# Patient Record
Sex: MALE | Race: WHITE | NOT HISPANIC OR LATINO | Employment: OTHER | URBAN - METROPOLITAN AREA
[De-identification: names, ages, dates, MRNs, and addresses within clinical notes are randomized per-mention and may not be internally consistent; named-entity substitution may affect disease eponyms.]

---

## 2021-02-03 ENCOUNTER — HOSPITAL ENCOUNTER (INPATIENT)
Facility: MEDICAL CENTER | Age: 86
LOS: 3 days | DRG: 066 | End: 2021-02-09
Attending: INTERNAL MEDICINE | Admitting: INTERNAL MEDICINE
Payer: MEDICARE

## 2021-02-03 DIAGNOSIS — R47.01 APHASIA: ICD-10-CM

## 2021-02-03 DIAGNOSIS — E05.90 HYPERTHYROIDISM: ICD-10-CM

## 2021-02-03 DIAGNOSIS — I63.9 CEREBROVASCULAR ACCIDENT (CVA), UNSPECIFIED MECHANISM (HCC): ICD-10-CM

## 2021-02-03 DIAGNOSIS — I10 ESSENTIAL HYPERTENSION: ICD-10-CM

## 2021-02-03 DIAGNOSIS — I48.91 ATRIAL FIBRILLATION, UNSPECIFIED TYPE (HCC): ICD-10-CM

## 2021-02-03 PROCEDURE — 700111 HCHG RX REV CODE 636 W/ 250 OVERRIDE (IP): Performed by: STUDENT IN AN ORGANIZED HEALTH CARE EDUCATION/TRAINING PROGRAM

## 2021-02-03 PROCEDURE — 700101 HCHG RX REV CODE 250: Performed by: STUDENT IN AN ORGANIZED HEALTH CARE EDUCATION/TRAINING PROGRAM

## 2021-02-03 PROCEDURE — 96372 THER/PROPH/DIAG INJ SC/IM: CPT

## 2021-02-03 PROCEDURE — G0378 HOSPITAL OBSERVATION PER HR: HCPCS

## 2021-02-03 PROCEDURE — 700105 HCHG RX REV CODE 258: Performed by: STUDENT IN AN ORGANIZED HEALTH CARE EDUCATION/TRAINING PROGRAM

## 2021-02-03 PROCEDURE — 99220 PR INITIAL OBSERVATION CARE,LEVL III: CPT | Performed by: STUDENT IN AN ORGANIZED HEALTH CARE EDUCATION/TRAINING PROGRAM

## 2021-02-03 PROCEDURE — 96374 THER/PROPH/DIAG INJ IV PUSH: CPT

## 2021-02-03 RX ORDER — LABETALOL HYDROCHLORIDE 5 MG/ML
10 INJECTION, SOLUTION INTRAVENOUS EVERY 4 HOURS PRN
Status: DISCONTINUED | OUTPATIENT
Start: 2021-02-03 | End: 2021-02-09 | Stop reason: HOSPADM

## 2021-02-03 RX ORDER — POLYETHYLENE GLYCOL 3350 17 G/17G
1 POWDER, FOR SOLUTION ORAL
Status: DISCONTINUED | OUTPATIENT
Start: 2021-02-03 | End: 2021-02-05

## 2021-02-03 RX ORDER — BISACODYL 10 MG
10 SUPPOSITORY, RECTAL RECTAL
Status: DISCONTINUED | OUTPATIENT
Start: 2021-02-03 | End: 2021-02-05

## 2021-02-03 RX ORDER — ACETAMINOPHEN 325 MG/1
650 TABLET ORAL EVERY 6 HOURS PRN
Status: DISCONTINUED | OUTPATIENT
Start: 2021-02-03 | End: 2021-02-05

## 2021-02-03 RX ORDER — SODIUM CHLORIDE 9 MG/ML
INJECTION, SOLUTION INTRAVENOUS CONTINUOUS
Status: DISCONTINUED | OUTPATIENT
Start: 2021-02-03 | End: 2021-02-04

## 2021-02-03 RX ORDER — AMOXICILLIN 250 MG
2 CAPSULE ORAL 2 TIMES DAILY
Status: DISCONTINUED | OUTPATIENT
Start: 2021-02-03 | End: 2021-02-05

## 2021-02-03 RX ADMIN — LABETALOL HYDROCHLORIDE 10 MG: 5 INJECTION, SOLUTION INTRAVENOUS at 22:54

## 2021-02-03 RX ADMIN — ENOXAPARIN SODIUM 60 MG: 60 INJECTION SUBCUTANEOUS at 22:54

## 2021-02-03 RX ADMIN — SODIUM CHLORIDE: 9 INJECTION, SOLUTION INTRAVENOUS at 22:55

## 2021-02-03 SDOH — ECONOMIC STABILITY: TRANSPORTATION INSECURITY
IN THE PAST 12 MONTHS, HAS LACK OF TRANSPORTATION KEPT YOU FROM MEETINGS, WORK, OR FROM GETTING THINGS NEEDED FOR DAILY LIVING?: NO

## 2021-02-03 SDOH — ECONOMIC STABILITY: TRANSPORTATION INSECURITY
IN THE PAST 12 MONTHS, HAS THE LACK OF TRANSPORTATION KEPT YOU FROM MEDICAL APPOINTMENTS OR FROM GETTING MEDICATIONS?: NO

## 2021-02-03 ASSESSMENT — PATIENT HEALTH QUESTIONNAIRE - PHQ9
SUM OF ALL RESPONSES TO PHQ9 QUESTIONS 1 AND 2: 0
2. FEELING DOWN, DEPRESSED, IRRITABLE, OR HOPELESS: NOT AT ALL
1. LITTLE INTEREST OR PLEASURE IN DOING THINGS: NOT AT ALL

## 2021-02-03 ASSESSMENT — LIFESTYLE VARIABLES
HAVE YOU EVER FELT YOU SHOULD CUT DOWN ON YOUR DRINKING: NO
DOES PATIENT WANT TO STOP DRINKING: NO
EVER FELT BAD OR GUILTY ABOUT YOUR DRINKING: NO
TOTAL SCORE: 0
ALCOHOL_USE: NO
HAVE PEOPLE ANNOYED YOU BY CRITICIZING YOUR DRINKING: NO
TOTAL SCORE: 0
HOW MANY TIMES IN THE PAST YEAR HAVE YOU HAD 5 OR MORE DRINKS IN A DAY: 0
CONSUMPTION TOTAL: NEGATIVE
AVERAGE NUMBER OF DAYS PER WEEK YOU HAVE A DRINK CONTAINING ALCOHOL: 0
ON A TYPICAL DAY WHEN YOU DRINK ALCOHOL HOW MANY DRINKS DO YOU HAVE: 0
EVER HAD A DRINK FIRST THING IN THE MORNING TO STEADY YOUR NERVES TO GET RID OF A HANGOVER: NO
TOTAL SCORE: 0

## 2021-02-03 ASSESSMENT — FIBROSIS 4 INDEX: FIB4 SCORE: 1.76

## 2021-02-04 PROBLEM — I48.91 A-FIB (HCC): Status: ACTIVE | Noted: 2021-02-04

## 2021-02-04 PROBLEM — E05.90 HYPERTHYROIDISM: Status: ACTIVE | Noted: 2021-02-04

## 2021-02-04 LAB
ALBUMIN SERPL BCP-MCNC: 3.7 G/DL (ref 3.2–4.9)
ALBUMIN/GLOB SERPL: 1.4 G/DL
ALP SERPL-CCNC: 80 U/L (ref 30–99)
ALT SERPL-CCNC: 15 U/L (ref 2–50)
ANION GAP SERPL CALC-SCNC: 11 MMOL/L (ref 7–16)
AST SERPL-CCNC: 21 U/L (ref 12–45)
BASOPHILS # BLD AUTO: 1.1 % (ref 0–1.8)
BASOPHILS # BLD: 0.08 K/UL (ref 0–0.12)
BILIRUB SERPL-MCNC: 1 MG/DL (ref 0.1–1.5)
BUN SERPL-MCNC: 16 MG/DL (ref 8–22)
CALCIUM SERPL-MCNC: 9.1 MG/DL (ref 8.5–10.5)
CHLORIDE SERPL-SCNC: 105 MMOL/L (ref 96–112)
CHOLEST SERPL-MCNC: 182 MG/DL (ref 100–199)
CO2 SERPL-SCNC: 22 MMOL/L (ref 20–33)
CREAT SERPL-MCNC: 1.12 MG/DL (ref 0.5–1.4)
EOSINOPHIL # BLD AUTO: 0.11 K/UL (ref 0–0.51)
EOSINOPHIL NFR BLD: 1.6 % (ref 0–6.9)
ERYTHROCYTE [DISTWIDTH] IN BLOOD BY AUTOMATED COUNT: 45.3 FL (ref 35.9–50)
EST. AVERAGE GLUCOSE BLD GHB EST-MCNC: 114 MG/DL
GLOBULIN SER CALC-MCNC: 2.6 G/DL (ref 1.9–3.5)
GLUCOSE SERPL-MCNC: 99 MG/DL (ref 65–99)
HBA1C MFR BLD: 5.6 % (ref 0–5.6)
HCT VFR BLD AUTO: 43.1 % (ref 42–52)
HDLC SERPL-MCNC: 48 MG/DL
HGB BLD-MCNC: 14.3 G/DL (ref 14–18)
IMM GRANULOCYTES # BLD AUTO: 0.03 K/UL (ref 0–0.11)
IMM GRANULOCYTES NFR BLD AUTO: 0.4 % (ref 0–0.9)
LDLC SERPL CALC-MCNC: 117 MG/DL
LYMPHOCYTES # BLD AUTO: 1.56 K/UL (ref 1–4.8)
LYMPHOCYTES NFR BLD: 22.4 % (ref 22–41)
MCH RBC QN AUTO: 28 PG (ref 27–33)
MCHC RBC AUTO-ENTMCNC: 33.2 G/DL (ref 33.7–35.3)
MCV RBC AUTO: 84.3 FL (ref 81.4–97.8)
MONOCYTES # BLD AUTO: 0.54 K/UL (ref 0–0.85)
MONOCYTES NFR BLD AUTO: 7.8 % (ref 0–13.4)
NEUTROPHILS # BLD AUTO: 4.64 K/UL (ref 1.82–7.42)
NEUTROPHILS NFR BLD: 66.7 % (ref 44–72)
NRBC # BLD AUTO: 0 K/UL
NRBC BLD-RTO: 0 /100 WBC
PLATELET # BLD AUTO: 220 K/UL (ref 164–446)
PMV BLD AUTO: 12.6 FL (ref 9–12.9)
POTASSIUM SERPL-SCNC: 3.7 MMOL/L (ref 3.6–5.5)
PROT SERPL-MCNC: 6.3 G/DL (ref 6–8.2)
RBC # BLD AUTO: 5.11 M/UL (ref 4.7–6.1)
SODIUM SERPL-SCNC: 138 MMOL/L (ref 135–145)
TRIGL SERPL-MCNC: 85 MG/DL (ref 0–149)
WBC # BLD AUTO: 7 K/UL (ref 4.8–10.8)

## 2021-02-04 PROCEDURE — 700105 HCHG RX REV CODE 258: Performed by: STUDENT IN AN ORGANIZED HEALTH CARE EDUCATION/TRAINING PROGRAM

## 2021-02-04 PROCEDURE — 80053 COMPREHEN METABOLIC PANEL: CPT

## 2021-02-04 PROCEDURE — 97165 OT EVAL LOW COMPLEX 30 MIN: CPT

## 2021-02-04 PROCEDURE — 92612 ENDOSCOPY SWALLOW (FEES) VID: CPT

## 2021-02-04 PROCEDURE — 83036 HEMOGLOBIN GLYCOSYLATED A1C: CPT

## 2021-02-04 PROCEDURE — 96376 TX/PRO/DX INJ SAME DRUG ADON: CPT

## 2021-02-04 PROCEDURE — 92610 EVALUATE SWALLOWING FUNCTION: CPT

## 2021-02-04 PROCEDURE — 97161 PT EVAL LOW COMPLEX 20 MIN: CPT

## 2021-02-04 PROCEDURE — 99225 PR SUBSEQUENT OBSERVATION CARE,LEVEL II: CPT | Performed by: INTERNAL MEDICINE

## 2021-02-04 PROCEDURE — 96372 THER/PROPH/DIAG INJ SC/IM: CPT

## 2021-02-04 PROCEDURE — 36415 COLL VENOUS BLD VENIPUNCTURE: CPT

## 2021-02-04 PROCEDURE — 700111 HCHG RX REV CODE 636 W/ 250 OVERRIDE (IP): Performed by: STUDENT IN AN ORGANIZED HEALTH CARE EDUCATION/TRAINING PROGRAM

## 2021-02-04 PROCEDURE — 85025 COMPLETE CBC W/AUTO DIFF WBC: CPT

## 2021-02-04 PROCEDURE — G0378 HOSPITAL OBSERVATION PER HR: HCPCS

## 2021-02-04 PROCEDURE — 80061 LIPID PANEL: CPT

## 2021-02-04 RX ORDER — LOSARTAN POTASSIUM 25 MG/1
25 TABLET ORAL
Status: DISCONTINUED | OUTPATIENT
Start: 2021-02-04 | End: 2021-02-04

## 2021-02-04 RX ORDER — DEXTROSE AND SODIUM CHLORIDE 5; .9 G/100ML; G/100ML
INJECTION, SOLUTION INTRAVENOUS CONTINUOUS
Status: DISCONTINUED | OUTPATIENT
Start: 2021-02-04 | End: 2021-02-05

## 2021-02-04 RX ORDER — LOSARTAN POTASSIUM 25 MG/1
25 TABLET ORAL
Status: DISCONTINUED | OUTPATIENT
Start: 2021-02-04 | End: 2021-02-05

## 2021-02-04 RX ORDER — ATORVASTATIN CALCIUM 40 MG/1
40 TABLET, FILM COATED ORAL EVERY EVENING
Status: DISCONTINUED | OUTPATIENT
Start: 2021-02-04 | End: 2021-02-05

## 2021-02-04 RX ADMIN — LABETALOL HYDROCHLORIDE 10 MG: 5 INJECTION, SOLUTION INTRAVENOUS at 17:19

## 2021-02-04 RX ADMIN — DEXTROSE AND SODIUM CHLORIDE: 5; 900 INJECTION, SOLUTION INTRAVENOUS at 23:26

## 2021-02-04 RX ADMIN — SODIUM CHLORIDE: 9 INJECTION, SOLUTION INTRAVENOUS at 11:12

## 2021-02-04 RX ADMIN — LABETALOL HYDROCHLORIDE 10 MG: 5 INJECTION, SOLUTION INTRAVENOUS at 12:35

## 2021-02-04 RX ADMIN — LABETALOL HYDROCHLORIDE 10 MG: 5 INJECTION, SOLUTION INTRAVENOUS at 05:17

## 2021-02-04 RX ADMIN — LABETALOL HYDROCHLORIDE 10 MG: 5 INJECTION, SOLUTION INTRAVENOUS at 04:10

## 2021-02-04 RX ADMIN — ENOXAPARIN SODIUM 60 MG: 60 INJECTION SUBCUTANEOUS at 23:26

## 2021-02-04 RX ADMIN — ENOXAPARIN SODIUM 60 MG: 60 INJECTION SUBCUTANEOUS at 11:00

## 2021-02-04 ASSESSMENT — PATIENT HEALTH QUESTIONNAIRE - PHQ9
SUM OF ALL RESPONSES TO PHQ9 QUESTIONS 1 AND 2: 0
SUM OF ALL RESPONSES TO PHQ9 QUESTIONS 1 AND 2: 0
2. FEELING DOWN, DEPRESSED, IRRITABLE, OR HOPELESS: NOT AT ALL
2. FEELING DOWN, DEPRESSED, IRRITABLE, OR HOPELESS: NOT AT ALL
1. LITTLE INTEREST OR PLEASURE IN DOING THINGS: NOT AT ALL
1. LITTLE INTEREST OR PLEASURE IN DOING THINGS: NOT AT ALL

## 2021-02-04 ASSESSMENT — ENCOUNTER SYMPTOMS
CONSTIPATION: 0
WEIGHT LOSS: 0
FOCAL WEAKNESS: 1
BLURRED VISION: 0
WHEEZING: 0
SPUTUM PRODUCTION: 0
ABDOMINAL PAIN: 0
ORTHOPNEA: 0
SHORTNESS OF BREATH: 0
NECK PAIN: 0
CHILLS: 0
WEAKNESS: 0
SORE THROAT: 0
DIARRHEA: 0
FEVER: 0
SPEECH CHANGE: 1
HEADACHES: 0
HEMOPTYSIS: 0
PALPITATIONS: 0
BLOOD IN STOOL: 0
VOMITING: 0
COUGH: 0
DIAPHORESIS: 0
BACK PAIN: 0
BRUISES/BLEEDS EASILY: 0
MYALGIAS: 0
SEIZURES: 0
LOSS OF CONSCIOUSNESS: 0
EYE PAIN: 0
FLANK PAIN: 0
NAUSEA: 0
DIZZINESS: 0

## 2021-02-04 ASSESSMENT — COGNITIVE AND FUNCTIONAL STATUS - GENERAL
DAILY ACTIVITIY SCORE: 24
SUGGESTED CMS G CODE MODIFIER DAILY ACTIVITY: CH
MOBILITY SCORE: 24
SUGGESTED CMS G CODE MODIFIER DAILY ACTIVITY: CH
SUGGESTED CMS G CODE MODIFIER MOBILITY: CH
DAILY ACTIVITIY SCORE: 24
MOBILITY SCORE: 24
SUGGESTED CMS G CODE MODIFIER MOBILITY: CH

## 2021-02-04 ASSESSMENT — GAIT ASSESSMENTS
GAIT LEVEL OF ASSIST: SUPERVISED
DISTANCE (FEET): 200

## 2021-02-04 ASSESSMENT — FIBROSIS 4 INDEX: FIB4 SCORE: 2.12

## 2021-02-04 ASSESSMENT — ACTIVITIES OF DAILY LIVING (ADL): TOILETING: INDEPENDENT

## 2021-02-04 NOTE — ASSESSMENT & PLAN NOTE
Stroke secondary to atrial fibrillation due to non compliance with anticoagulation medication  Aphasia, dysarthria, lower right sided facial droop and dysphagia. NIHSS 4  No other focal neuro deficits appreciated  Reports some improvement since initial onset  Has history of CVA in 2001  Hx of Afib, stopped his Eliquis in December 2020 on his own  MRI brain was negative for acute stroke but revealed old left occipital lobe and right lacunar infarcts  Discussed with Neurology Dr Acuna  Restart Eliquis   Started on Lipitor 80 mg daily  BP control with Losartan 50  Continue speech therapy

## 2021-02-04 NOTE — PROGRESS NOTES
2 RN skin check completed with ANTHONY Estrada.     Devices in place: cardiac monitor and PIV     TYRONE ears, heels, elbows, and sacrum intact, pink, and blanching. Small red possible mole noted behind R ear.

## 2021-02-04 NOTE — ASSESSMENT & PLAN NOTE
Diagnosed w/ afib January 2020  Was started on eliquis at that time but discontinued it  currently rate controlled. Patient is refusing metoprolol states it causes severe sweating  Restarted on Eliquis  Started on cardizem  Counseled on importance of taking it consistently to prevent future strokes

## 2021-02-04 NOTE — PROGRESS NOTES
Pt's BP at 0359: 172/100. Pt's BP at 0500: 164/114. Medicated pt as per MAR. Rechecked BP at 0629: 169/104. No changes in neurological status. On-call provider notified.

## 2021-02-04 NOTE — PROGRESS NOTES
Dr. Cristobal to bedside. MD aware of pt's current BP and failed swallow screen. Orders received.

## 2021-02-04 NOTE — THERAPY
"Occupational Therapy   Initial Evaluation     Patient Name: Kerwin Wheat  Age:  86 y.o., Sex:  male  Medical Record #: [id number]  Today's Date: 2/4/2021          Assessment  Patient is 86 y.o. male admitted for slurred speech, possible expressive aphasia, MRI and CT (-). Pt lives in a SLH with son, independent with all mobility and ADLs at baseline. Pt presents at his functional baseline, did not require any physical assistance and able to complete all tasks safely. No needs identified, will complete order.    Plan    Recommend Occupational Therapy for Evaluation only.    DC Equipment Recommendations: (P) None  Discharge Recommendations: (P) Anticipate that the patient will have no further occupational therapy needs after discharge from the hospital     Subjective    \"When can I drink water?\"     Objective       02/04/21 0830   Prior Living Situation   Prior Services None   Housing / Facility 1 Story House   Steps Into Home 0   Steps In Home 0   Bathroom Set up Walk In Shower   Equipment Owned None   Lives with - Patient's Self Care Capacity Adult Children   Prior Level of ADL Function   Self Feeding Independent   Grooming / Hygiene Independent   Bathing Independent   Dressing Independent   Toileting Independent   Prior Level of IADL Function   Medication Management Independent   Laundry Independent   Kitchen Mobility Independent   Finances Independent   Home Management Independent   Shopping Independent   Prior Level Of Mobility Independent Without Device in Community   Driving / Transportation Unable To Determine At This Time   Occupation (Pre-Hospital Vocational) Retired Due To Age   History of Falls   History of Falls No   Pain 0 - 10 Group   Therapist Pain Assessment 0;Post Activity Pain Same as Prior to Activity   Non Verbal Descriptors   Non Verbal Scale  Calm   Cognition    Cognition / Consciousness WDL   Level of Consciousness Alert   Comments pleasant, cooperative   Active ROM Upper Body   Active " ROM Upper Body  WDL   Dominant Hand Right   Strength Upper Body   Upper Body Strength  WDL   Sensation Upper Body   Upper Extremity Sensation  WDL   Upper Body Muscle Tone   Upper Body Muscle Tone  WDL   Neurological Concerns   Neurological Concerns No   Coordination Upper Body   Coordination WDL   Balance Assessment   Sitting Balance (Static) Fair +   Sitting Balance (Dynamic) Fair +   Standing Balance (Static) Fair +   Standing Balance (Dynamic) Fair +   Weight Shift Sitting Good   Weight Shift Standing Good   Comments no AD needed   Bed Mobility    Supine to Sit Supervised   Sit to Supine Supervised   Scooting Supervised   ADL Assessment   Grooming Supervision;Standing   Upper Body Dressing Supervision   Lower Body Dressing Supervision   Toileting Supervision   How much help from another person does the patient currently need...   Putting on and taking off regular lower body clothing? 4   Bathing (including washing, rinsing, and drying)? 4   Toileting, which includes using a toilet, bedpan, or urinal? 4   Putting on and taking off regular upper body clothing? 4   Taking care of personal grooming such as brushing teeth? 4   Eating meals? 4   6 Clicks Daily Activity Score 24   Functional Mobility   Sit to Stand Supervised   Bed, Chair, Wheelchair Transfer Supervised   Toilet Transfers Supervised   Transfer Method Stand Step   Mobility bed mobility, mobility in hallway, bathroom mobility, BTB   Comments no AD   Visual Perception   Visual Perception  WDL   Activity Tolerance   Sitting in Chair 5   Standing 5   Education Group   Education Provided Role of Occupational Therapist   Role of Occupational Therapist Patient Response Patient;Acceptance;Explanation   Interdisciplinary Plan of Care Collaboration   IDT Collaboration with  Nursing   Patient Position at End of Therapy In Bed;Call Light within Reach;Tray Table within Reach;Phone within Reach   Collaboration Comments RN updated

## 2021-02-04 NOTE — PROGRESS NOTES
[facility] TRIAGE OFFICER DIRECT ADMISSION REPORT  Transferring facility: [facility]   Transferring physician: Dr Hannah  Chief complaint: aphasia   Pertinent history & patient course:      86 year old male with hx of afib and CVA came with aphasia, MRI and CTA did not show any acute finding or acute stroke, his symptom did not improve, Dr Acuna neurologist was consulted and recommended to transfer the patient to [facility] for neurology consult and workup.  The patient has not taken eliquis due to side effects?? for 2 months      Further work up or recommendations per triage officer prior to transfer: none  Consultants called prior to transfer and pertinent input from consultants: neurology Dr England  Patient accepted for transfer: Yes  Consultants to be called upon arrival: Neurology   Admission status: Observation.   Floor requested: tele  If ICU transfer, name of intensivist case discussed with and pertinent input from critical care: n/a    Please inform the triage officer upon arrival of the patient to [facility] for assignment of a hospitalist to perform admission.     For any question or concerns regarding the care of this patient, please reach out to the assigned hospitalist.

## 2021-02-04 NOTE — PROGRESS NOTES
Hospital Medicine Daily Progress Note    Date of Service  2/4/2021    Chief Complaint  86 y.o. male with a past medical history of hypertension, atrial fibrillation not on anticoagulation, stroke admitted 2/3/2021 with sudden onset of aphasia that started on 2/3/2021 at 3 PM    Hospital Course      Interval Problem Update  Patient reports some improvement of his aphasia continues to have significant dysarthria.  He also has facial asymmetry. He was evaluated by SLP and was noted to have some signs of aspiration therefore he was recommended to be kept n.p.o. with a FEES study.  His blood pressure remains elevated, he is past the window for permissive hypertension.  I have started him on losartan.  IV labetalol as needed for SBP greater than 160.  He will be continued on Lovenox at this time until he is able to tolerate p.o. intake at which time we will start him on Eliquis.    Consultants/Specialty  None    Code Status  Full Code    Disposition  Likely home tomorrow    Review of Systems  Review of Systems   Constitutional: Negative for chills, diaphoresis and fever.   HENT: Negative for hearing loss and sore throat.    Eyes: Negative for blurred vision.   Respiratory: Negative for cough, sputum production, shortness of breath and wheezing.    Cardiovascular: Negative for chest pain, palpitations and leg swelling.   Gastrointestinal: Negative for abdominal pain, blood in stool, diarrhea, nausea and vomiting.   Genitourinary: Negative for dysuria, flank pain and urgency.   Musculoskeletal: Negative for back pain, joint pain, myalgias and neck pain.   Skin: Negative for rash.   Neurological: Positive for speech change and focal weakness. Negative for dizziness, seizures and headaches.   Endo/Heme/Allergies: Does not bruise/bleed easily.   Psychiatric/Behavioral: Negative for suicidal ideas.   All other systems reviewed and are negative.       Physical Exam  Temp:  [36.3 °C (97.3 °F)-36.4 °C (97.6 °F)] 36.4 °C (97.6  °F)  Pulse:  [68-98] 78  Resp:  [14-17] 16  BP: (159-172)/() 168/102  SpO2:  [96 %-97 %] 97 %    Physical Exam  Vitals signs and nursing note reviewed.   Constitutional:       General: He is not in acute distress.     Appearance: Normal appearance.   HENT:      Head: Normocephalic and atraumatic.      Nose: Nose normal.      Mouth/Throat:      Mouth: Mucous membranes are moist.   Eyes:      Extraocular Movements: Extraocular movements intact.      Conjunctiva/sclera: Conjunctivae normal.      Pupils: Pupils are equal, round, and reactive to light.   Neck:      Musculoskeletal: Normal range of motion and neck supple.   Cardiovascular:      Rate and Rhythm: Normal rate and regular rhythm.      Pulses: Normal pulses.      Heart sounds: Normal heart sounds.   Pulmonary:      Effort: Pulmonary effort is normal. No respiratory distress.      Breath sounds: Normal breath sounds. No wheezing, rhonchi or rales.   Abdominal:      General: Bowel sounds are normal. There is no distension.      Palpations: Abdomen is soft.      Tenderness: There is no abdominal tenderness.   Musculoskeletal: Normal range of motion.         General: No swelling or tenderness.   Lymphadenopathy:      Cervical: No cervical adenopathy.   Skin:     General: Skin is warm.      Coloration: Skin is not jaundiced.      Findings: No rash.   Neurological:      General: No focal deficit present.      Mental Status: He is alert and oriented to person, place, and time.      Cranial Nerves: No cranial nerve deficit.      Motor: No weakness.      Comments: Right facial droop  Mild aphasia and moderate dysarthria  Strength and sensation intact of bilateral upper and lower extremities   Psychiatric:         Mood and Affect: Mood normal.         Behavior: Behavior normal.         Fluids    Intake/Output Summary (Last 24 hours) at 2/4/2021 1408  Last data filed at 2/3/2021 2200  Gross per 24 hour   Intake --   Output 450 ml   Net -450 ml        Laboratory  Recent Labs     02/03/21  1505 02/04/21  0646   WBC 9.1 7.0   RBC 5.42 5.11   HEMOGLOBIN 15.2 14.3   HEMATOCRIT 45.4 43.1   MCV 83.8 84.3   MCH 28.0 28.0   MCHC 33.5 33.2*   RDW 14.7* 45.3   PLATELETCT 254 220   MPV 10.8* 12.6     Recent Labs     02/03/21  1505 02/04/21  0646   SODIUM 142 138   POTASSIUM 3.3* 3.7   CHLORIDE 102 105   CO2 27 22   GLUCOSE 88 99   BUN 23* 16   CREATININE 1.5* 1.12   CALCIUM 9.0 9.1     Recent Labs     02/03/21  1505   INR 1.02         Recent Labs     02/04/21  0646   TRIGLYCERIDE 85   HDL 48   *       Imaging  EC-ECHOCARDIOGRAM COMPLETE W/O CONT    (Results Pending)        Assessment/Plan  * Aphasia- (present on admission)  Assessment & Plan  Aphasia and lower right sided facial droop  No other focal neuro deficits appreciated  Reports some improvement since initial onset earlier today  Has history of CVA in 2001  Hx of Afib, stopped his Eliquis in December 2020 on his own  MRI brain was negative for acute stroke but revealed old left occipital lobe and right lacunar infarcts  Restart full dose AC w/ Lovenox 1mg/kg BID. Restart Eliquis once he is able to swallow  Neurology (Dr. Acuna) was consulted    Hyperthyroidism  Assessment & Plan  TSH 4.31, FT4 1.06  - will need repeat TSH testing outpatient    A-fib (HCC)- (present on admission)  Assessment & Plan  Diagnosed w/ afib January 2020  Was started on eliquis at that time but discontinued it  currently rate controlled  Lovenox 1mg/kg BID for now    VTE: SCD, Lovenox full dose       VTE prophylaxis: Lovenox

## 2021-02-04 NOTE — H&P
Hospital Medicine History & Physical Note    Date of Service  2/3/2021    Primary Care Physician  Pcp Not In Computer    Consultants  Neurology    Code Status  Full Code    Chief Complaint  Slurring of speech    History of Presenting Illness  86 y.o. male who presented 2/3/2021 transfer from [facility] for slurred speech.  She has history of A. 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  This afternoon patient was in normal state of health, when around 2:50 PM he developed acute onset inability to talk.  Son reported that patient went to go to the bathroom and upon his return he was unable to speak.  He was reported to be frustrated that he was unable to get his words out.  At that time, there were no reports of seizure-like activity, confusion, motor weakness, or obvious facial droop.  Son was concerned for possible stroke at which time he was brought to [facility] ED.  While there, CTA head and neck and MRI brain were performed without significant findings.  Chest x-ray was also performed without significant findings.  Ammonia level was less than 10, troponin I less than 0.02, UA negative, B12 level 10,453, TSH 4.31, free T4 1.06, Covid negative.  Etiology of aphasia was unclear.  Patient was given 1 L NS, magnesium, and Tylenol.  Neurology was consulted and recommended patient be transferred to [facility] for further work-up.  Patient currently lying in bed in no acute distress.  Denies any acute complaints at this time.  Does report some improvement in his aphasia.    Review of Systems  Review of Systems   Constitutional: Negative for chills, fever and weight loss.   HENT: Negative for hearing loss and sore throat.    Eyes: Negative for blurred vision and pain.   Respiratory: Negative for cough, hemoptysis, shortness of breath and wheezing.    Cardiovascular: Negative for chest pain, palpitations,  orthopnea and leg swelling.   Gastrointestinal: Negative for abdominal pain, blood in stool, constipation, diarrhea, nausea and vomiting.   Genitourinary: Negative for dysuria and hematuria.   Musculoskeletal: Negative for joint pain and myalgias.   Skin: Negative for rash.   Neurological: Positive for speech change (aphasia). Negative for dizziness, loss of consciousness and weakness.       Past Medical History   has a past medical history of 2002.    Surgical History  Significant past surgical history    Family History  No significant family history    Social History   reports previous alcohol use. He reports that he does not use drugs.    Allergies  No Known Allergies    Medications  Prior to Admission Medications   Prescriptions Last Dose Informant Patient Reported? Taking?   Apixaban (ELIQUIS PO)   Yes No   Sig: Take  by mouth.   Ascorbic Acid (ADOLFO-C PO) 2/2/2021 at Unknown time  Yes No   Sig: Take  by mouth.   Magnesium Gluconate (MAGNESIUM 27 PO) 2/2/2021 at Unknown time  Yes No   Sig: Take  by mouth.   metoprolol SR (TOPROL XL) 25 MG TABLET SR 24 HR 2/2/2021 at Unknown time  Yes No   Sig: Take 25 mg by mouth every day.      Facility-Administered Medications: None       Physical Exam  Temp:  [36.3 °C (97.3 °F)] 36.3 °C (97.3 °F)  Pulse:  [72] 72  Resp:  [14] 14  BP: (169)/(93) 169/93  SpO2:  [97 %] 97 %    Physical Exam  Vitals signs and nursing note reviewed.   Constitutional:       General: He is not in acute distress.     Appearance: Normal appearance.   HENT:      Mouth/Throat:      Mouth: Mucous membranes are moist.   Eyes:      Extraocular Movements: Extraocular movements intact.   Cardiovascular:      Rate and Rhythm: Normal rate and regular rhythm.      Heart sounds: No murmur. No gallop.    Pulmonary:      Effort: Pulmonary effort is normal.      Breath sounds: Normal breath sounds. No wheezing, rhonchi or rales.   Abdominal:      General: Abdomen is flat. Bowel sounds are normal. There is no  distension.      Palpations: Abdomen is soft.      Tenderness: There is no abdominal tenderness.   Musculoskeletal: Normal range of motion.         General: No deformity.      Right lower leg: No edema.      Left lower leg: No edema.   Neurological:      General: No focal deficit present.      Mental Status: He is alert and oriented to person, place, and time.      Sensory: No sensory deficit.      Motor: No weakness.      Comments: Aphasia. Right sided facial droop         Laboratory:  Recent Labs     02/03/21  1505   WBC 9.1   RBC 5.42   HEMOGLOBIN 15.2   HEMATOCRIT 45.4   MCV 83.8   MCH 28.0   MCHC 33.5   RDW 14.7*   PLATELETCT 254   MPV 10.8*     Recent Labs     02/03/21  1505   SODIUM 142   POTASSIUM 3.3*   CHLORIDE 102   CO2 27   GLUCOSE 88   BUN 23*   CREATININE 1.5*   CALCIUM 9.0     Recent Labs     02/03/21  1505   ALTSGPT 25   ASTSGOT 26   ALKPHOSPHAT 106   TBILIRUBIN 0.8   GLUCOSE 88     Recent Labs     02/03/21  1505   INR 1.02     No results for input(s): NTPROBNP in the last 72 hours.      No results for input(s): TROPONINT in the last 72 hours.    Imaging:  No orders to display         Assessment/Plan:  I anticipate this patient is appropriate for observation status at this time.    * Aphasia- (present on admission)  Assessment & Plan  Aphasia and lower right sided facial droop  No other focal neuro deficits appreciated  Reports some improvement since initial onset earlier today  Has history of CVA in 2001  Hx of Afib, stopped his Eliquis in December 2020 on his own  CTA head, MRI brain, and CXR unrevealing  Unclear cause of symptoms at this time  - Npo  - Speech swallow eval  - Pt/ot  - F/u A1c, lipid  - Restart full dose AC w/ Lovenox 1mg/kg BID  - Neurology (Dr. Acuna) was consulted    Hyperthyroidism  Assessment & Plan  TSH 4.31, FT4 1.06  - will need repeat TSH testing outpatient    A-fib (HCC)- (present on admission)  Assessment & Plan  Diagnosed w/ afib January 2020  Was started on eliquis at  that time  Stopped eliquis in Dec due to side effects? (sweating, headaches, dizziness)  - currently rate controlled at this time, restart home Metop XL 50mg BID  - Lovenox 1mg/kg BID for now    VTE: SCD, Lovenox full dose

## 2021-02-04 NOTE — PROGRESS NOTES
Pt arrived to 710 via [facility]vey with two EMS providers. Patient is A&Ox4, exhibiting expressive aphasia, and awake in bed. Patient is in no signs of distress, unlabored breathing on RA and denies pain at this time. Patient was updated on the plan of care for the day and oriented to unit. Call light within reach, bed in low position, 3 side rails up. Fall precautions in place, tele box is on, and confirmed cardiac rhythm is being monitored. Will round hourly.

## 2021-02-04 NOTE — THERAPY
"Speech Language Pathology   Clinical Swallow Evaluation     Patient Name: Kerwin Wheat  AGE:  86 y.o., SEX:  male  Medical Record #: [id number]  Today's Date: 2/4/2021     Precautions: Fall Risk, Swallow Precautions ( See Comments)  Comments: Aspiration risk     Assessment    Patient is 86 y.o. male admitted 2/3/21 for possible CVA. Son was concerned for possible stroke at which time he was brought to [facility] ED.  While there, CTA head and neck and MRI brain were performed without significant findings. PMHx: A. 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 Chest x-ray was also performed without significant findings. Additional factors influencing patient status/progress: ?baseline dysphagia, dysarthria, expressive aphasia.    Patient was seen on this date for a clinical swallow evaluation. Son at bedside for session. Patient awake, AAOx4, and eager for PO. Speech with mild expressive aphasia and mild-mod dysarthria (formal speech-language evaluation indicated). Oral motor examination revealed minimal facial asymmetry at rest but appeared symmetrical during labial retraction task. Otherwise, was unremarkable. PO trials were administered and consisted of ice chips, MTL, applesauce, pudding, and thin liquids. Patient presented with inconsistent s/sx of aspiration as seen by inconsistent throat clearing response (50-75% of all trials), wet vocal quality x1 following MTL and thin liquids, and coughing response x1 following MTL, thins, and pudding. Belching response x3 occurred and pt reported due to having \"an empty stomach.\" After one of coughing episodes, son stated, \"that's not uncommon.\" Education provided to pt and son regarding current status and SLP recs.    Plan    Given inconsistent s/sx of aspiration, unknown history of dysphagia, and current dysarthria would recommend continue NPO with a FEES to be " "completed this afternoon to further assess oropharyngeal function and rule out aspiration. OK for pt to have single ice chips in the interim. Please consider placing orders for a cognitive-linguistic evaluation given acute changes to speech and language.     Recommend Speech Therapy 3 times per week until therapy goals are met for the following treatments:  Dysphagia Training and Patient / Family / Caregiver Education.    Discharge Recommendations: Recommend post-acute placement for additional speech therapy services prior to discharge home    Objective       02/04/21 1046   Vitals   O2 Delivery Device None - Room Air   Prior Level Of Function   Communication Within Functional Limits   Swallow Impaired  (Per son, throat clearing and cough is \"not uncommon\")   Dentition Intact   Dentures None   Hearing Within Functional Limits for Evaluation   Hearing Aid None   Vision Within Functional Limits for Evaluation   Patient's Primary Language English   Oral Motor Eval    Is Patient Able to Complete Oral Motor Eval Yes but Impaired   Labial Function   Labial Structure At Rest Minimal  (?natural asymmetry of face)   Labial Vowel Production / I /, / U / Within Functional Limits   Labial Sequence / I /, / U / Within Functional Limits   Lingual Function   Lingual Structure At Rest Within Functional Limits   Lingual Protrude Within Functional Limits   Lingual Retract Within Functional Limits   Elevate In Mouth Within Functional Limits   Elevate Outside Mouth Within Functional Limits   Lateralization No Impairment Right;No Impairment Left   / Pa / 5X's Minimal   / Ta / 5X's Minimal   / Ka / 5X's Minimal   / [place] / 5X's Moderate   Jaw   Jaw Structure At Rest Within Functional Limits   Bite ([place]) Not Tested   Chew ([place]) Not Tested   Jaw Open / Resist Within Functional Limits   Jaw Close / Resist Within Functional Limits   Velar Function   Velar Structure At Rest Within Functional Limits   / A / Prolonged Within Functional " Limits   Laryngeal Function   Voice Quality Within Functional Limits   Volutional Cough Within Functional Limits   Excursion Upon Swallow Complete   Oral Food Presentation   Ice Chips Within Functional Limits   Single Swallow Mildly Thick (2) - (Nectar Thick)  Moderate   Single Swallow Thin (0) Moderate   Liquidised (3) Moderate   Pureed (4) Minimal   Self Feeding Independent   Dysphagia Strategies / Recommendations   Compensatory Strategies To Be Assessed   Diet / Liquid Recommendation NPO;Pre-Feeding Trials with SLP Only   Medication Administration    (TBD)   Therapy Interventions Dysphagia Therapy By Speech Language Pathologist;FEES Evaluation   Short Term Goals   Short Term Goal # 1 Patient will consume prefeeding trials with SLP with no overt s/sx of aspiration.

## 2021-02-04 NOTE — THERAPY
Physical Therapy   Initial Evaluation     Patient Name: Kerwin Wheat  Age:  86 y.o., Sex:  male  Medical Record #: [id number]  Today's Date: 2/4/2021          Assessment  Patient is 86 y.o. male transferred from [facility] due to slurred speech.  MRI and CTA negative.  Hx of CVA in 2002.  He lives in [place] w/ his son where he was indep w/ mobility.  Today, he presents essentially at his PLOF.  He is able to move in/out of bed and ambulate w/o  ft w/o loss of balance.  No acute PT needs at this time.  PT will be available for d/c needs.    Plan    Recommend Physical Therapy for Evaluation only    DC Equipment Recommendations: None  Discharge Recommendations: Anticipate that the patient will have no further physical therapy needs after discharge from the hospital         Objective       02/04/21 0825   Prior Living Situation   Housing / Facility [address] [facility]   Steps Into Home 0   Steps In Home 0   Equipment Owned None   Lives with - Patient's Self Care Capacity Adult Children   Prior Level of Functional Mobility   Bed Mobility Independent   Transfer Status Independent   Ambulation Independent   Assistive Devices Used None   Stairs Independent   Strength Lower Body   Comments no gross deviation affecting fxl mobility   Strength Upper Body   Comments no gross deviations affecting fxl mobility   Balance Assessment   Sitting Balance (Static) Fair +   Sitting Balance (Dynamic) Fair +   Standing Balance (Static) Fair +   Standing Balance (Dynamic) Fair +   Weight Shift Sitting Good   Weight Shift Standing Good   Gait Analysis   Gait Level Of Assist Supervised   Assistive Device None   Distance (Feet) 200   Bed Mobility    Supine to Sit Supervised   Sit to Supine Supervised   Scooting Supervised   Functional Mobility   Sit to Stand Supervised   Bed, Chair, Wheelchair Transfer Supervised   Anticipated Discharge Equipment and Recommendations   DC Equipment Recommendations None   Discharge  Recommendations Anticipate that the patient will have no further physical therapy needs after discharge from the hospital

## 2021-02-04 NOTE — PROGRESS NOTES
Monitor summary: A fib: 74-86  -/.12/-  (R) PVC  Per strip from monitor room    12 hour chart check!

## 2021-02-05 ENCOUNTER — APPOINTMENT (OUTPATIENT)
Dept: RADIOLOGY | Facility: MEDICAL CENTER | Age: 86
DRG: 066 | End: 2021-02-05
Attending: INTERNAL MEDICINE
Payer: MEDICARE

## 2021-02-05 ENCOUNTER — APPOINTMENT (OUTPATIENT)
Dept: CARDIOLOGY | Facility: MEDICAL CENTER | Age: 86
DRG: 066 | End: 2021-02-05
Attending: INTERNAL MEDICINE
Payer: MEDICARE

## 2021-02-05 PROBLEM — I63.9 STROKE (HCC): Status: ACTIVE | Noted: 2021-02-03

## 2021-02-05 PROBLEM — I10 ESSENTIAL HYPERTENSION: Status: ACTIVE | Noted: 2021-02-05

## 2021-02-05 LAB
ANION GAP SERPL CALC-SCNC: 9 MMOL/L (ref 7–16)
BASOPHILS # BLD AUTO: 1.2 % (ref 0–1.8)
BASOPHILS # BLD: 0.07 K/UL (ref 0–0.12)
BUN SERPL-MCNC: 15 MG/DL (ref 8–22)
CALCIUM SERPL-MCNC: 8.8 MG/DL (ref 8.5–10.5)
CHLORIDE SERPL-SCNC: 107 MMOL/L (ref 96–112)
CO2 SERPL-SCNC: 24 MMOL/L (ref 20–33)
CREAT SERPL-MCNC: 1.32 MG/DL (ref 0.5–1.4)
EOSINOPHIL # BLD AUTO: 0.19 K/UL (ref 0–0.51)
EOSINOPHIL NFR BLD: 3.2 % (ref 0–6.9)
ERYTHROCYTE [DISTWIDTH] IN BLOOD BY AUTOMATED COUNT: 46.7 FL (ref 35.9–50)
GLUCOSE SERPL-MCNC: 95 MG/DL (ref 65–99)
HCT VFR BLD AUTO: 42 % (ref 42–52)
HGB BLD-MCNC: 13.7 G/DL (ref 14–18)
IMM GRANULOCYTES # BLD AUTO: 0.01 K/UL (ref 0–0.11)
IMM GRANULOCYTES NFR BLD AUTO: 0.2 % (ref 0–0.9)
LV EJECT FRACT  99904: 55
LV EJECT FRACT MOD 2C 99903: 41.27
LV EJECT FRACT MOD 4C 99902: 46.89
LV EJECT FRACT MOD BP 99901: 43.49
LYMPHOCYTES # BLD AUTO: 1.9 K/UL (ref 1–4.8)
LYMPHOCYTES NFR BLD: 31.8 % (ref 22–41)
MCH RBC QN AUTO: 28 PG (ref 27–33)
MCHC RBC AUTO-ENTMCNC: 32.6 G/DL (ref 33.7–35.3)
MCV RBC AUTO: 85.9 FL (ref 81.4–97.8)
MONOCYTES # BLD AUTO: 0.51 K/UL (ref 0–0.85)
MONOCYTES NFR BLD AUTO: 8.5 % (ref 0–13.4)
NEUTROPHILS # BLD AUTO: 3.29 K/UL (ref 1.82–7.42)
NEUTROPHILS NFR BLD: 55.1 % (ref 44–72)
NRBC # BLD AUTO: 0 K/UL
NRBC BLD-RTO: 0 /100 WBC
PLATELET # BLD AUTO: 208 K/UL (ref 164–446)
PMV BLD AUTO: 12.1 FL (ref 9–12.9)
POTASSIUM SERPL-SCNC: 3.8 MMOL/L (ref 3.6–5.5)
RBC # BLD AUTO: 4.89 M/UL (ref 4.7–6.1)
SODIUM SERPL-SCNC: 140 MMOL/L (ref 135–145)
WBC # BLD AUTO: 6 K/UL (ref 4.8–10.8)

## 2021-02-05 PROCEDURE — 700102 HCHG RX REV CODE 250 W/ 637 OVERRIDE(OP): Performed by: INTERNAL MEDICINE

## 2021-02-05 PROCEDURE — G0378 HOSPITAL OBSERVATION PER HR: HCPCS

## 2021-02-05 PROCEDURE — 96376 TX/PRO/DX INJ SAME DRUG ADON: CPT

## 2021-02-05 PROCEDURE — A9270 NON-COVERED ITEM OR SERVICE: HCPCS | Performed by: INTERNAL MEDICINE

## 2021-02-05 PROCEDURE — 93306 TTE W/DOPPLER COMPLETE: CPT

## 2021-02-05 PROCEDURE — 36415 COLL VENOUS BLD VENIPUNCTURE: CPT

## 2021-02-05 PROCEDURE — 93306 TTE W/DOPPLER COMPLETE: CPT | Mod: 26 | Performed by: INTERNAL MEDICINE

## 2021-02-05 PROCEDURE — 92526 ORAL FUNCTION THERAPY: CPT

## 2021-02-05 PROCEDURE — 99225 PR SUBSEQUENT OBSERVATION CARE,LEVEL II: CPT | Performed by: INTERNAL MEDICINE

## 2021-02-05 PROCEDURE — 85025 COMPLETE CBC W/AUTO DIFF WBC: CPT

## 2021-02-05 PROCEDURE — 80048 BASIC METABOLIC PNL TOTAL CA: CPT

## 2021-02-05 RX ORDER — ACETAMINOPHEN 325 MG/1
650 TABLET ORAL EVERY 6 HOURS PRN
Status: DISCONTINUED | OUTPATIENT
Start: 2021-02-05 | End: 2021-02-09 | Stop reason: HOSPADM

## 2021-02-05 RX ORDER — POLYETHYLENE GLYCOL 3350 17 G/17G
1 POWDER, FOR SOLUTION ORAL
Status: DISCONTINUED | OUTPATIENT
Start: 2021-02-05 | End: 2021-02-06

## 2021-02-05 RX ORDER — ATORVASTATIN CALCIUM 40 MG/1
40 TABLET, FILM COATED ORAL EVERY EVENING
Status: DISCONTINUED | OUTPATIENT
Start: 2021-02-05 | End: 2021-02-09 | Stop reason: HOSPADM

## 2021-02-05 RX ORDER — LOSARTAN POTASSIUM 25 MG/1
25 TABLET ORAL
Status: DISCONTINUED | OUTPATIENT
Start: 2021-02-06 | End: 2021-02-05

## 2021-02-05 RX ORDER — BISACODYL 10 MG
10 SUPPOSITORY, RECTAL RECTAL
Status: DISCONTINUED | OUTPATIENT
Start: 2021-02-05 | End: 2021-02-06

## 2021-02-05 RX ORDER — LOSARTAN POTASSIUM 50 MG/1
50 TABLET ORAL
Status: DISCONTINUED | OUTPATIENT
Start: 2021-02-06 | End: 2021-02-09 | Stop reason: HOSPADM

## 2021-02-05 RX ORDER — AMOXICILLIN 250 MG
2 CAPSULE ORAL 2 TIMES DAILY
Status: DISCONTINUED | OUTPATIENT
Start: 2021-02-05 | End: 2021-02-06

## 2021-02-05 RX ADMIN — APIXABAN 5 MG: 5 TABLET, FILM COATED ORAL at 13:35

## 2021-02-05 RX ADMIN — ATORVASTATIN CALCIUM 40 MG: 40 TABLET, FILM COATED ORAL at 17:49

## 2021-02-05 RX ADMIN — LABETALOL HYDROCHLORIDE 10 MG: 5 INJECTION, SOLUTION INTRAVENOUS at 18:03

## 2021-02-05 RX ADMIN — APIXABAN 5 MG: 5 TABLET, FILM COATED ORAL at 23:09

## 2021-02-05 ASSESSMENT — CHA2DS2 SCORE
PRIOR STROKE OR TIA OR THROMBOEMBOLISM: YES
CHA2DS2 VASC SCORE: 5
SEX: MALE
AGE 75 OR GREATER: YES
HYPERTENSION: YES
DIABETES: NO
AGE 65 TO 74: NO
CHF OR LEFT VENTRICULAR DYSFUNCTION: NO
VASCULAR DISEASE: NO

## 2021-02-05 ASSESSMENT — ENCOUNTER SYMPTOMS
SPUTUM PRODUCTION: 0
SEIZURES: 0
FEVER: 0
PALPITATIONS: 0
MYALGIAS: 0
ABDOMINAL PAIN: 0
FLANK PAIN: 0
BLURRED VISION: 0
VOMITING: 0
SORE THROAT: 0
BACK PAIN: 0
WHEEZING: 0
COUGH: 0
DIARRHEA: 0
BLOOD IN STOOL: 0
DIZZINESS: 0
DIAPHORESIS: 0
SPEECH CHANGE: 1
SHORTNESS OF BREATH: 0
CHILLS: 0
HEADACHES: 0
NAUSEA: 0
BRUISES/BLEEDS EASILY: 0
NECK PAIN: 0
FOCAL WEAKNESS: 1

## 2021-02-05 ASSESSMENT — PATIENT HEALTH QUESTIONNAIRE - PHQ9
2. FEELING DOWN, DEPRESSED, IRRITABLE, OR HOPELESS: NOT AT ALL
1. LITTLE INTEREST OR PLEASURE IN DOING THINGS: NOT AT ALL
SUM OF ALL RESPONSES TO PHQ9 QUESTIONS 1 AND 2: 0

## 2021-02-05 ASSESSMENT — FIBROSIS 4 INDEX: FIB4 SCORE: 2.24

## 2021-02-05 NOTE — PROGRESS NOTES
Received bedside report and assumed care of patient. Pt is A&Ox4 and awake in bed. Patient showing no signs of distress, no complaints of pain, and is on RA. Tele monitor in place. All fall precautions are in place. Seizure and aspiration precaution in place. Call light within reach, bed in low position, 2 side rails up, and belongings at bedside table. Son at bedside  Pt and son was updated on Plan of Care, no questions or concerns. Pt educated on use of call light for assistance. Will continue to monitor.

## 2021-02-05 NOTE — PROGRESS NOTES
Cortrak Placement    Tube Team verified patient name and medical record number prior to tube placement.  Cortrak tube (55 inches, 10 Citizen of [place]) placed at 75 cm in right nare.  Per Cortrak picture, tube appears to be in the stomach.  Nursing Instructions: Awaiting KUB to confirm placement before use for medications or feeding. Once placement confirmed, flush tube with 30 ml of water, and then remove and save stylet, in patient medication drawer.

## 2021-02-05 NOTE — CARE PLAN
Problem: Safety:  Goal: Risk of aspiration will decrease  Outcome: PROGRESSING SLOWER THAN EXPECTED     Problem: Fluid Volume:  Goal: Will maintain balanced intake and output  Outcome: PROGRESSING AS EXPECTED

## 2021-02-05 NOTE — ASSESSMENT & PLAN NOTE
Uncontrolled  Started on Cardizem  Continue Cozaar 50  IV labetalol as needed for SBP greater than 160

## 2021-02-05 NOTE — PROGRESS NOTES
Inform dr Miller that patient is NPO for fail of swallow eval. Patient has PO blood pressure medication. Doctor states to hold medication.

## 2021-02-05 NOTE — THERAPY
Speech Language Pathology   Fiberoptic Endoscopic Evaluation of Swallow     Patient Name: Kerwin Wheat  AGE:  86 y.o., SEX:  male  Medical Record #: [id number]  Today's Date: 2/4/2021    Precautions: (P) Fall Risk, Swallow Precautions ( See Comments)  Comments: (P) Aspiration risk     Assessment    Patient is 86 y.o. male admitted 2/3/21 for possible CVA. Son was concerned for possible stroke at which time [place] was [place] [facility]  While there, CTA head and neck and MRI brain were performed without significant findings. PMHx: A. 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 Chest x-ray was also performed without significant findings. Additional factors influencing patient status/progress: ?baseline dysphagia, dysarthria, expressive aphasia.    Patient was seen on this date for a FEES evaluation. Scope was inserted through right nare and tolerated procedure without incident. Upon insertion of scope, pharynx appeared symmetrical with complete vocal cord adduction achieved with all laryngeal tasks. PO trials were administered and consisted of ice chips, MTL, applesauce, pudding, and thin liquids. Patient is presenting with moderate-severe oropharyngeal dysphagia characterized by poor sensation and reduced bolus control resulting in mistiming of pharyngeal swallow; weak pharyngeal squeeze; weak tongue base retraction; and weak laryngeal elevation and hyolaryngeal excursion. Mild-moderate residue in vallecula and pyriform sinuses as well as at the UES junction concerning for cricopharyngeal dysfunction. Residue on posterior pharyngeal wall with pureed consistencies. Penetration occurred before the swallow with trials of MTL and thin liquids and penetration suspected during the swallow with liquidized and pureed consistencies. Aspiration suspected during the swallow with mildly thick liquids and thin liquids and  aspiration after the swallow with thin liquids as seen by blue residue below level of vocal cords on anterior trachea. A cued cough was ineffective in eliminating aspirates from airway. Patient had cough x2 during study and wet vocal quality with throat clearing after study. Otherwise, aspiration and penetration was silent in nature.     Utilizing images from study, patient extensively educated regarding current swallow function, risk of aspiration, and SLP recs. Pt agreeable to cortrak placement. With patient's permission, called son to update regarding results of evaluation.     Plan    Patient is presenting with moderate-severe oropharyngeal dysphagia and is at very high risk for aspiration. Recommend continue NPO with strong consideration for cortrak placement. OK for patient to have a few single ice chips an hour with implementation of trained swallow exercise (effortful swallow x2-3). SLP following closely. Neurology consult pending.     Recommend Speech Therapy 5 times per week until therapy goals are met for the following treatments:  Dysphagia Training and Patient / Family / Caregiver Education.    Discharge Recommendations: Recommend post-acute placement for additional speech therapy services prior to discharge home     Objective       02/04/21 1555   Vitals   O2 Delivery Device None - Room Air   FEES Evaluation Prior To Procedure   Respiratory Status Room Air   Type of Airway Nasal Pharyngeal;Oral Pharyngeal   Onset Date Of Dysphagia 02/03/21   Dysphagia Symptoms Warranting Video Swallow Inconsistent throat clearing and coughing at bedside   Procedure Performed While Patient Sitting In Bed   Seated at (Degrees) 90   A Flexible Endoscope Was Introduced Transnasally In The Right Nare   FEES Laryngeal Adduction Assessment   Breath Holding Adequate   Clearing Throat Adequate   Cough Adequate   Phonation Adequate   Onset Of Swallow Adequate   FEES Velopharyngeal Assessment    Velopharyngeal Closure: Adequate    FEES Sensation Assessment    Presence of Scope Adequate   Presence of Residue Inadequate   Presence of Penetration Absent Response   Presence of Aspiration Absent Response   FEES Swallow Function Assessment   Swallow Trigger Impaired   Base of Tongue Retraction Impaired   Pharyngeal Constrictor Movement Impaired   White Out Phase Incomplete White Out   Epiglottic Movement Impaired   Laryngeal Elevation Impaired   Cricopharyngeal Function Impaired   Swallow Observations / Symptoms   Vallecular Residue Liquidised (3);Pureed (4);Thin (0);Mildly Thick (2) - (Nectar Thick)   Pyriform Sinus Residue Mildly Thick (2) - (Nectar Thick);Thin (0);Liquidised (3);Pureed (4)   Posterior Pharyngeal Wall Residue Pureed (4)   Penetration Suspected During the Swallow Pureed (4)   Aspiration Suspected During the Swallow Mildly Thick (2) - (Nectar Thick);Thin (0)   Aspiration After the Swallow Thin (0)   Compensatory Strategies Attempted   Multiple Swallows Minimally effective in reducing pharyngeal and laryngeal residue    Cough / Clear After Swallow Did not eliminate aspirates    Patient Ability To Protect Airway   Patient Ability To Protect Airway  Inadequate   Penetration Aspiration Scale   Penetration Aspiration Scale 8 - Material passes glottis and is not ejected, visible subglottic stasis, absent patient response   [place] Pharyngeal Residue Severity   Vallecular Residue Moderate, epiglottic ligament covered   Pyriform Sinus Residue  Moderate, up wall to ½ full   Evaluation Recommendations   Diet / Liquid Recommendation NPO;Pre-Feeding Trials with SLP Only   Medication Administration  Via Gastric Tube   Short Term Goals   Short Term Goal # 1 Patient will consume prefeeding trials with SLP with no overt s/sx of aspiration.    Goal Outcome # 1 Goal not met

## 2021-02-05 NOTE — DIETARY
"Nutrition Support Assessment:  Day 0 of admit.  Kerwin Wheat is a 86 y.o. male with admitting DX of expressive aphasia      Current problem list:  1. A-fib  2. Hyperthyroidism  3. Stroke    RD able to visit pt at bedside. Pt with visitor, though visitor did not disclose relationship to pt. Pt states last meal was Tuesday and had yogurt with berries. Denies changes to intake prior to stroke. Pt reports potential weight loss, as states was 160 lbs about a year ago and was told is 148 lbs now. Pt does not elaborate on what may have caused this weight loss. Denies concerns or questions for RD at this time.      Assessment:  Estimated Nutritional Needs based on:   Height: 182.9 cm (6' 0.01\")  Weight: 67.4 kg (148 lb 9.4 oz)  Weight to Use in Calculations: 67.4 kg (148 lb 9.4 oz)  Ideal Body Weight: 80.7 kg (178 lb)  Body mass index is 20.15 kg/m²., BMI classification: WNL    Calculation/Equation: MSJ x 1.2 = 1672 kcal   Total Calories / day: 1685 - 1887  (Calories / k - 28)  Total Grams Protein / day: 80- 94  (Grams Protein / k.2 - 1.4)     Evaluation:   1. Indication for nutrition support: Pt underwent FEES and found to have high risk for aspiration.   2. RD discussed with MD. Plan is to have pt undergo 4-5 days of intensive speech therapy and will determine at that time if pt to be discharged with PEG vs cortrak. MD requests pt to receive bolus feedings at this time to mimic meal times and allow for uninterrupted therapy services.   3. Enteral access via gastric cortrak. Pt with potential inadequate intake x 2 days thus far.   4. Per pt report, pt has experienced a potential 12 lb loss within past ~1 year, which is a 7.5% loss. While this is not significant, it is worth noting nonetheless.   5. Pt appears adequately nourished per visual observation   6. Labs: reviewed   7. Meds: meds held- eliquis, lipitor, cozar, senokot, D5 NS infusion   8. Last BM: 21  9.  Standard formula likely appropriate " to meet pt's current estimated nutrition needs.      Malnutrition Risk: Pt does not appear to present with malnutrition at this time, does not meet criteria.      Recommendations/Plan:  1. Recommend Fibersource HN @ bolus via syringe or bolus via pump per RN preference.   2. If choosing bolus via pump, Start bolus 100 ml of formula at 1st feeding. If tolerated, advance bolus feeding 100 ml at each feeding until goal of (450 mL at meal times + 200 mL HS) is reached. This provides 1860 kcal (27 kcal/kg), 83 grams protein (1.2 gm/kg), and 1255 ml free water per day.  3. Fluids per MD, however recommend provide 60 mL free water before and after administeration of formula to provide additional 720 mL free water and ensure tube patency. Total fluids needs from TF and free water flushes 1975 ml per day.   4. Diet upgrades per SLP  5. Monitor weight    RD following

## 2021-02-05 NOTE — PROGRESS NOTES
Hospital Medicine Daily Progress Note    Date of Service  2/5/2021    Chief Complaint  86 y.o. male with a past medical history of hypertension, atrial fibrillation not on anticoagulation, stroke admitted 2/3/2021 with sudden onset of aphasia that started on 2/3/2021 at 3 PM    Hospital Course      Interval Problem Update  2/4 Patient reports some improvement of his aphasia continues to have significant dysarthria.  He also has facial asymmetry. He was evaluated by SLP and was noted to have some signs of aspiration therefore he was recommended to be kept n.p.o. with a FEES study.  His blood pressure remains elevated, he is past the window for permissive hypertension.  I have started him on losartan.  IV labetalol as needed for SBP greater than 160.  He will be continued on Lovenox at this time until he is able to tolerate p.o. intake at which time we will start him on Eliquis.    2/5 PT underwent FEES that revealed moderate oropharyngeal dysphagia and is at very high risk for aspiration.  Patient has agreed to Cortrak placement.  We will continue speech therapy in-house.  Patient has no other PT or OT needs therefore is not a candidate for inpatient rehab.  I discussed the case with neurology  who recommended restarting Eliquis for his atrial fibrillation and stroke.  No indication for repeat MRI.  Patient has a clinical diagnosis of stroke secondary to atrial fibrillation due to noncompliance with medication.      Consultants/Specialty  None    Code Status  Full Code    Disposition  Possible transfer to SNF for intensive speech and swallow therapy or neuro restorative vs home with home health vs home with outpatient SLP    May transfer to neuro    Review of Systems  Review of Systems   Constitutional: Negative for chills, diaphoresis and fever.   HENT: Negative for hearing loss and sore throat.    Eyes: Negative for blurred vision.   Respiratory: Negative for cough, sputum production, shortness of breath  and wheezing.    Cardiovascular: Negative for chest pain, palpitations and leg swelling.   Gastrointestinal: Negative for abdominal pain, blood in stool, diarrhea, nausea and vomiting.   Genitourinary: Negative for dysuria, flank pain and urgency.   Musculoskeletal: Negative for back pain, joint pain, myalgias and neck pain.   Skin: Negative for rash.   Neurological: Positive for speech change and focal weakness. Negative for dizziness, seizures and headaches.   Endo/Heme/Allergies: Does not bruise/bleed easily.   Psychiatric/Behavioral: Negative for suicidal ideas.   All other systems reviewed and are negative.       Physical Exam  Temp:  [36.2 °C (97.2 °F)-36.7 °C (98 °F)] 36.7 °C (98 °F)  Pulse:  [65-85] 71  Resp:  [16-18] 18  BP: (145-172)/() 158/97  SpO2:  [96 %-99 %] 98 %    Physical Exam  Vitals signs and nursing note reviewed.   Constitutional:       General: He is not in acute distress.     Appearance: Normal appearance.   HENT:      Head: Normocephalic and atraumatic.      Nose: Nose normal.      Mouth/Throat:      Mouth: Mucous membranes are moist.   Eyes:      Extraocular Movements: Extraocular movements intact.      Conjunctiva/sclera: Conjunctivae normal.      Pupils: Pupils are equal, round, and reactive to light.   Neck:      Musculoskeletal: Normal range of motion and neck supple.   Cardiovascular:      Rate and Rhythm: Normal rate and regular rhythm.      Pulses: Normal pulses.      Heart sounds: Normal heart sounds.   Pulmonary:      Effort: Pulmonary effort is normal. No respiratory distress.      Breath sounds: Normal breath sounds. No wheezing, rhonchi or rales.   Abdominal:      General: Bowel sounds are normal. There is no distension.      Palpations: Abdomen is soft.      Tenderness: There is no abdominal tenderness.   Musculoskeletal: Normal range of motion.         General: No swelling or tenderness.   Lymphadenopathy:      Cervical: No cervical adenopathy.   Skin:     General: Skin  is warm.      Coloration: Skin is not jaundiced.      Findings: No rash.   Neurological:      General: No focal deficit present.      Mental Status: He is alert and oriented to person, place, and time.      Cranial Nerves: No cranial nerve deficit.      Motor: No weakness.      Comments: Right facial droop  Mild aphasia and moderate dysarthria  Strength and sensation intact of bilateral upper and lower extremities   Psychiatric:         Mood and Affect: Mood normal.         Behavior: Behavior normal.         Fluids  No intake or output data in the 24 hours ending 02/05/21 1125    Laboratory  Recent Labs     02/03/21  1505 02/04/21  0646 02/05/21  0719   WBC 9.1 7.0 6.0   RBC 5.42 5.11 4.89   HEMOGLOBIN 15.2 14.3 13.7*   HEMATOCRIT 45.4 43.1 42.0   MCV 83.8 84.3 85.9   MCH 28.0 28.0 28.0   MCHC 33.5 33.2* 32.6*   RDW 14.7* 45.3 46.7   PLATELETCT 254 220 208   MPV 10.8* 12.6 12.1     Recent Labs     02/03/21  1505 02/04/21  0646 02/05/21  0719   SODIUM 142 138 140   POTASSIUM 3.3* 3.7 3.8   CHLORIDE 102 105 107   CO2 27 22 24   GLUCOSE 88 99 95   BUN 23* 16 15   CREATININE 1.5* 1.12 1.32   CALCIUM 9.0 9.1 8.8     Recent Labs     02/03/21  1505   INR 1.02         Recent Labs     02/04/21  0646   TRIGLYCERIDE 85   HDL 48   *       Imaging  DX-ABDOMEN FOR TUBE PLACEMENT   Final Result      Enteric tube tip projects over the stomach.      EC-ECHOCARDIOGRAM COMPLETE W/O CONT    (Results Pending)        Assessment/Plan  * Stroke (HCC)- (present on admission)  Assessment & Plan  Stroke secondary to atrial fibrillation due to non compliance with anticoagulation medication  Aphasia, dysarthria, lower right sided facial droop and dysphagia. NIHSS 4  No other focal neuro deficits appreciated  Reports some improvement since initial onset  Has history of CVA in 2001  Hx of Afib, stopped his Eliquis in December 2020 on his own  MRI brain was negative for acute stroke but revealed old left occipital lobe and right lacunar  infarcts  Discussed with Neurology Dr Acuna  Restart Eliquis via cortrak  Started on Lipitor 80 mg daily  BP control with Losartan 50    Essential hypertension  Assessment & Plan  Uncontrolled  Started on Cozaar 50  IV labetalol as needed for SBP greater than 160    Hyperthyroidism  Assessment & Plan  TSH 4.31, FT4 1.06  - will need repeat TSH testing outpatient    A-fib (HCC)- (present on admission)  Assessment & Plan  Diagnosed w/ afib January 2020  Was started on eliquis at that time but discontinued it  currently rate controlled. Patient is refusing metoprolol states it causes severe sweating  Restarted on Eliquis  Counseled on importance of taking it consistently to prevent future strokes    VTE: SCD, Lovenox full dose       VTE prophylaxis: Lovenox

## 2021-02-05 NOTE — DISCHARGE PLANNING
[facility] Acute Rehabilitation Transitional Care Coordination     Referral from:  Dr. Hayden Alejo  Facesheet indicates:  Medicare/[place] [employer]  Potential Rehab Diagnosis:  Stroke    Chart review indicates patient has on going medical management and therapy needs to possibly meet inpatient rehab facility criteria with the goal of returning to community.    D/C support:  Adult Children     Physiatry consult denied.  Current documentation does not reflect 2/3 therapy need meeting CMS criteria.  Please see Dr. Jensen's note for recommendations.      Last Covid test date:  2/03/2021 - COVID negative      Thank you for the referral.

## 2021-02-05 NOTE — PROGRESS NOTES
Chart reviewed for rehab appropriateness and patient is not a candidate for IPR.  [facility] rehab requires a patient to have needs with a minimum of 2 out of 3 therapy services 5 days a week (PT, OT, SLP).  Kerwin is currently supervision with PT and OT and therefore is not a candidate.  Case workers should look into alternative placement that offers intensive single discipline therapy as it is clear he needs SLP for both speech and swallow.  This can be accomplished as an outpatient if he has 24/7 supervision at home, or from a inpatient setting such as a SNF or possibly neuro restorative.    Misael Jensen, DO   Physical Medicine and Rehabilitation   2/5/2021

## 2021-02-06 ENCOUNTER — APPOINTMENT (OUTPATIENT)
Dept: RADIOLOGY | Facility: MEDICAL CENTER | Age: 86
DRG: 066 | End: 2021-02-06
Attending: INTERNAL MEDICINE
Payer: MEDICARE

## 2021-02-06 LAB
ANION GAP SERPL CALC-SCNC: 12 MMOL/L (ref 7–16)
BASOPHILS # BLD AUTO: 1.3 % (ref 0–1.8)
BASOPHILS # BLD: 0.09 K/UL (ref 0–0.12)
BUN SERPL-MCNC: 15 MG/DL (ref 8–22)
CALCIUM SERPL-MCNC: 9.6 MG/DL (ref 8.5–10.5)
CHLORIDE SERPL-SCNC: 103 MMOL/L (ref 96–112)
CO2 SERPL-SCNC: 24 MMOL/L (ref 20–33)
CREAT SERPL-MCNC: 1.21 MG/DL (ref 0.5–1.4)
CRP SERPL HS-MCNC: 0.14 MG/DL (ref 0–0.75)
EOSINOPHIL # BLD AUTO: 0.16 K/UL (ref 0–0.51)
EOSINOPHIL NFR BLD: 2.3 % (ref 0–6.9)
ERYTHROCYTE [DISTWIDTH] IN BLOOD BY AUTOMATED COUNT: 44.4 FL (ref 35.9–50)
GLUCOSE SERPL-MCNC: 88 MG/DL (ref 65–99)
HCT VFR BLD AUTO: 45.6 % (ref 42–52)
HGB BLD-MCNC: 15.4 G/DL (ref 14–18)
IMM GRANULOCYTES # BLD AUTO: 0.02 K/UL (ref 0–0.11)
IMM GRANULOCYTES NFR BLD AUTO: 0.3 % (ref 0–0.9)
LYMPHOCYTES # BLD AUTO: 1.34 K/UL (ref 1–4.8)
LYMPHOCYTES NFR BLD: 19 % (ref 22–41)
MCH RBC QN AUTO: 28.2 PG (ref 27–33)
MCHC RBC AUTO-ENTMCNC: 33.8 G/DL (ref 33.7–35.3)
MCV RBC AUTO: 83.5 FL (ref 81.4–97.8)
MONOCYTES # BLD AUTO: 0.51 K/UL (ref 0–0.85)
MONOCYTES NFR BLD AUTO: 7.2 % (ref 0–13.4)
NEUTROPHILS # BLD AUTO: 4.92 K/UL (ref 1.82–7.42)
NEUTROPHILS NFR BLD: 69.9 % (ref 44–72)
NRBC # BLD AUTO: 0 K/UL
NRBC BLD-RTO: 0 /100 WBC
PLATELET # BLD AUTO: 236 K/UL (ref 164–446)
PMV BLD AUTO: 12.3 FL (ref 9–12.9)
POTASSIUM SERPL-SCNC: 3.7 MMOL/L (ref 3.6–5.5)
PREALB SERPL-MCNC: 22 MG/DL (ref 18–38)
RBC # BLD AUTO: 5.46 M/UL (ref 4.7–6.1)
SODIUM SERPL-SCNC: 139 MMOL/L (ref 135–145)
WBC # BLD AUTO: 7 K/UL (ref 4.8–10.8)

## 2021-02-06 PROCEDURE — 84134 ASSAY OF PREALBUMIN: CPT

## 2021-02-06 PROCEDURE — 36415 COLL VENOUS BLD VENIPUNCTURE: CPT

## 2021-02-06 PROCEDURE — 770020 HCHG ROOM/CARE - TELE (206)

## 2021-02-06 PROCEDURE — 700101 HCHG RX REV CODE 250: Performed by: STUDENT IN AN ORGANIZED HEALTH CARE EDUCATION/TRAINING PROGRAM

## 2021-02-06 PROCEDURE — 96376 TX/PRO/DX INJ SAME DRUG ADON: CPT

## 2021-02-06 PROCEDURE — 85025 COMPLETE CBC W/AUTO DIFF WBC: CPT

## 2021-02-06 PROCEDURE — 99232 SBSQ HOSP IP/OBS MODERATE 35: CPT | Performed by: INTERNAL MEDICINE

## 2021-02-06 PROCEDURE — 700102 HCHG RX REV CODE 250 W/ 637 OVERRIDE(OP): Performed by: INTERNAL MEDICINE

## 2021-02-06 PROCEDURE — 92526 ORAL FUNCTION THERAPY: CPT

## 2021-02-06 PROCEDURE — 80048 BASIC METABOLIC PNL TOTAL CA: CPT

## 2021-02-06 PROCEDURE — G0378 HOSPITAL OBSERVATION PER HR: HCPCS

## 2021-02-06 PROCEDURE — 86140 C-REACTIVE PROTEIN: CPT

## 2021-02-06 PROCEDURE — A9270 NON-COVERED ITEM OR SERVICE: HCPCS | Performed by: INTERNAL MEDICINE

## 2021-02-06 RX ORDER — DILTIAZEM HYDROCHLORIDE 60 MG/1
60 TABLET, FILM COATED ORAL TWICE DAILY
Status: DISCONTINUED | OUTPATIENT
Start: 2021-02-06 | End: 2021-02-09 | Stop reason: HOSPADM

## 2021-02-06 RX ORDER — POLYETHYLENE GLYCOL 3350 17 G/17G
1 POWDER, FOR SOLUTION ORAL
Status: DISCONTINUED | OUTPATIENT
Start: 2021-02-06 | End: 2021-02-09 | Stop reason: HOSPADM

## 2021-02-06 RX ORDER — BISACODYL 10 MG
10 SUPPOSITORY, RECTAL RECTAL
Status: DISCONTINUED | OUTPATIENT
Start: 2021-02-06 | End: 2021-02-09 | Stop reason: HOSPADM

## 2021-02-06 RX ORDER — DILTIAZEM HYDROCHLORIDE 120 MG/1
120 CAPSULE, COATED, EXTENDED RELEASE ORAL
Status: DISCONTINUED | OUTPATIENT
Start: 2021-02-06 | End: 2021-02-06

## 2021-02-06 RX ORDER — AMOXICILLIN 250 MG
2 CAPSULE ORAL 2 TIMES DAILY
Status: DISCONTINUED | OUTPATIENT
Start: 2021-02-06 | End: 2021-02-09 | Stop reason: HOSPADM

## 2021-02-06 RX ADMIN — APIXABAN 5 MG: 5 TABLET, FILM COATED ORAL at 08:58

## 2021-02-06 RX ADMIN — DILTIAZEM HYDROCHLORIDE 60 MG: 30 TABLET, FILM COATED ORAL at 10:19

## 2021-02-06 RX ADMIN — ATORVASTATIN CALCIUM 40 MG: 40 TABLET, FILM COATED ORAL at 17:12

## 2021-02-06 RX ADMIN — LABETALOL HYDROCHLORIDE 10 MG: 5 INJECTION, SOLUTION INTRAVENOUS at 04:46

## 2021-02-06 RX ADMIN — DILTIAZEM HYDROCHLORIDE 60 MG: 30 TABLET, FILM COATED ORAL at 17:12

## 2021-02-06 RX ADMIN — APIXABAN 5 MG: 5 TABLET, FILM COATED ORAL at 21:00

## 2021-02-06 RX ADMIN — LOSARTAN POTASSIUM 50 MG: 50 TABLET, FILM COATED ORAL at 08:59

## 2021-02-06 ASSESSMENT — ENCOUNTER SYMPTOMS
BLOOD IN STOOL: 0
WHEEZING: 0
NAUSEA: 0
SHORTNESS OF BREATH: 0
SPEECH CHANGE: 1
SORE THROAT: 0
CHILLS: 0
HEADACHES: 0
VOMITING: 0
COUGH: 0
DIZZINESS: 0
ABDOMINAL PAIN: 0
DIARRHEA: 0
PALPITATIONS: 0
BLURRED VISION: 0
SPUTUM PRODUCTION: 0
FOCAL WEAKNESS: 1
FEVER: 0
NECK PAIN: 0
BRUISES/BLEEDS EASILY: 0
BACK PAIN: 0
SEIZURES: 0
FLANK PAIN: 0
MYALGIAS: 0
DIAPHORESIS: 0

## 2021-02-06 ASSESSMENT — PAIN DESCRIPTION - PAIN TYPE: TYPE: ACUTE PAIN

## 2021-02-06 ASSESSMENT — FIBROSIS 4 INDEX: FIB4 SCORE: 1.98

## 2021-02-06 NOTE — PROGRESS NOTES
Hospital Medicine Daily Progress Note    Date of Service  2/6/2021    Chief Complaint  86 y.o. male with a past medical history of hypertension, atrial fibrillation not on anticoagulation, stroke admitted 2/3/2021 with sudden onset of aphasia that started on 2/3/2021 at 3 PM    Hospital Course      Interval Problem Update  2/4 Patient reports some improvement of his aphasia continues to have significant dysarthria.  He also has facial asymmetry. He was evaluated by SLP and was noted to have some signs of aspiration therefore he was recommended to be kept n.p.o. with a FEES study.  His blood pressure remains elevated, he is past the window for permissive hypertension.  I have started him on losartan.  IV labetalol as needed for SBP greater than 160.  He will be continued on Lovenox at this time until he is able to tolerate p.o. intake at which time we will start him on Eliquis.    2/5 PT underwent FEES that revealed moderate oropharyngeal dysphagia and is at very high risk for aspiration.  Patient has agreed to Cortrak placement.  We will continue speech therapy in-house.  Patient has no other PT or OT needs therefore is not a candidate for inpatient rehab.  I discussed the case with neurology  who recommended restarting Eliquis for his atrial fibrillation and stroke.  No indication for repeat MRI.  Patient has a clinical diagnosis of stroke secondary to atrial fibrillation due to noncompliance with medication.    2/6 blood pressure and heart rate were elevated this morning.  I have started the patient on Cardizem.  Continue SLP.  Plan is to continue inpatient SLP and hopefully patient progresses to the point where we can remove Cortrak and discharge him home.    Consultants/Specialty  None    Code Status  Full Code    Disposition  Possible transfer to SNF for intensive speech and swallow therapy or neuro restorative vs home with home health vs home with outpatient SLP    May transfer to neuro    Review of  Systems  Review of Systems   Constitutional: Negative for chills, diaphoresis and fever.   HENT: Negative for hearing loss and sore throat.    Eyes: Negative for blurred vision.   Respiratory: Negative for cough, sputum production, shortness of breath and wheezing.    Cardiovascular: Negative for chest pain, palpitations and leg swelling.   Gastrointestinal: Negative for abdominal pain, blood in stool, diarrhea, nausea and vomiting.   Genitourinary: Negative for dysuria, flank pain and urgency.   Musculoskeletal: Negative for back pain, joint pain, myalgias and neck pain.   Skin: Negative for rash.   Neurological: Positive for speech change and focal weakness. Negative for dizziness, seizures and headaches.   Endo/Heme/Allergies: Does not bruise/bleed easily.   Psychiatric/Behavioral: Negative for suicidal ideas.   All other systems reviewed and are negative.       Physical Exam  Temp:  [36 °C (96.8 °F)-36.7 °C (98 °F)] 36.6 °C (97.9 °F)  Pulse:  [] 71  Resp:  [17-18] 17  BP: (127-174)/() 127/77  SpO2:  [94 %-99 %] 95 %    Physical Exam  Vitals signs and nursing note reviewed.   Constitutional:       General: He is not in acute distress.     Appearance: Normal appearance.   HENT:      Head: Normocephalic and atraumatic.      Nose: Nose normal.      Mouth/Throat:      Mouth: Mucous membranes are moist.   Eyes:      Extraocular Movements: Extraocular movements intact.      Conjunctiva/sclera: Conjunctivae normal.      Pupils: Pupils are equal, round, and reactive to light.   Neck:      Musculoskeletal: Normal range of motion and neck supple.   Cardiovascular:      Rate and Rhythm: Normal rate and regular rhythm.      Pulses: Normal pulses.      Heart sounds: Normal heart sounds.   Pulmonary:      Effort: Pulmonary effort is normal. No respiratory distress.      Breath sounds: Normal breath sounds. No wheezing, rhonchi or rales.   Abdominal:      General: Bowel sounds are normal. There is no distension.       Palpations: Abdomen is soft.      Tenderness: There is no abdominal tenderness.   Musculoskeletal: Normal range of motion.         General: No swelling or tenderness.   Lymphadenopathy:      Cervical: No cervical adenopathy.   Skin:     General: Skin is warm.      Coloration: Skin is not jaundiced.      Findings: No rash.   Neurological:      General: No focal deficit present.      Mental Status: He is alert and oriented to person, place, and time.      Cranial Nerves: No cranial nerve deficit.      Motor: No weakness.      Comments: Right facial droop  Mild aphasia and moderate dysarthria  Strength and sensation intact of bilateral upper and lower extremities   Psychiatric:         Mood and Affect: Mood normal.         Behavior: Behavior normal.         Fluids    Intake/Output Summary (Last 24 hours) at 2/6/2021 1246  Last data filed at 2/6/2021 0900  Gross per 24 hour   Intake 1267 ml   Output 1200 ml   Net 67 ml       Laboratory  Recent Labs     02/04/21  0646 02/05/21  0719 02/06/21  0753   WBC 7.0 6.0 7.0   RBC 5.11 4.89 5.46   HEMOGLOBIN 14.3 13.7* 15.4   HEMATOCRIT 43.1 42.0 45.6   MCV 84.3 85.9 83.5   MCH 28.0 28.0 28.2   MCHC 33.2* 32.6* 33.8   RDW 45.3 46.7 44.4   PLATELETCT 220 208 236   MPV 12.6 12.1 12.3     Recent Labs     02/04/21  0646 02/05/21  0719 02/06/21  0753   SODIUM 138 140 139   POTASSIUM 3.7 3.8 3.7   CHLORIDE 105 107 103   CO2 22 24 24   GLUCOSE 99 95 88   BUN 16 15 15   CREATININE 1.12 1.32 1.21   CALCIUM 9.1 8.8 9.6     Recent Labs     02/03/21  1505   INR 1.02         Recent Labs     02/04/21  0646   TRIGLYCERIDE 85   HDL 48   *       Imaging  DX-ABDOMEN FOR TUBE PLACEMENT   Final Result      Enteric tube projects over the distal stomach.      EC-ECHOCARDIOGRAM COMPLETE W/O CONT   Final Result      DX-ABDOMEN FOR TUBE PLACEMENT   Final Result      Enteric tube tip projects over the stomach.           Assessment/Plan  * Stroke (HCC)- (present on admission)  Assessment &  Plan  Stroke secondary to atrial fibrillation due to non compliance with anticoagulation medication  Aphasia, dysarthria, lower right sided facial droop and dysphagia. NIHSS 4  No other focal neuro deficits appreciated  Reports some improvement since initial onset  Has history of CVA in 2001  Hx of Afib, stopped his Eliquis in December 2020 on his own  MRI brain was negative for acute stroke but revealed old left occipital lobe and right lacunar infarcts  Discussed with Neurology Dr Acuna  Restart Eliquis via cortrak  Started on Lipitor 80 mg daily  BP control with Losartan 50    Essential hypertension  Assessment & Plan  Uncontrolled  Started on Cardizem  Continue Cozaar 50  IV labetalol as needed for SBP greater than 160    Hyperthyroidism  Assessment & Plan  TSH 4.31, FT4 1.06  - will need repeat TSH testing outpatient    A-fib (HCC)- (present on admission)  Assessment & Plan  Diagnosed w/ afib January 2020  Was started on eliquis at that time but discontinued it  currently rate controlled. Patient is refusing metoprolol states it causes severe sweating  Restarted on Eliquis  Started on cardizem  Counseled on importance of taking it consistently to prevent future strokes           VTE prophylaxis: Eliquis

## 2021-02-06 NOTE — THERAPY
Speech Language Pathology  Daily Treatment     Patient Name: Kerwin Wheat  Age:  86 y.o., Sex:  male  Medical Record #: [id number]  Today's Date: 2/6/2021     Precautions  Precautions: Swallow Precautions ( See Comments)  Comments: Aspiration risk     Assessment    The patient was seen on this date for dysphagia treatment. Per RN and patient, he has been very compliant with exercises. The patient was able to demonstrate Shaker, falsettos, and effortful swallows with good accuracy independently. The patient was provided a rubber ball to complete CTAR, which he also completed with good accuracy. Ice chips were provided to the patient with throat clearing noted following 75% of swallows. The patient completed 2-3 swallows per bolus with implementation of effortful swallows in 80% of opportunities. Further education was provided to the patient regarding swallowing and swallowing exercises. Patient verbalized understanding. Recommend continue NPO with tubefeeding. Ensure continued compliance with exercises and secondary diagnostic to be preformed early next week to determine progress and readiness for PO intake.     Plan    Continue current treatment plan.    Discharge Recommendations: (P) Recommend post-acute placement for additional speech therapy services prior to discharge home     Objective       02/06/21 1006   Dysphagia    Oral / Pharyngeal / Laryngeal Exercises Pharyngeal Constriction Exercises;Laryngeal Exercises;Base of Tongue Exercises   Other Treatments PO trials of ice chips    Diet / Liquid Recommendation NPO;Pre-Feeding Trials with SLP Only   Nutritional Liquid Intake Rating Scale Nothing by mouth   Nutritional Food Intake Rating Scale Tube dependent with minimal attempts of oral intake   Nursing Communication Swallow Precaution Sign Posted at Head of Bed   Skilled Intervention Compensatory Strategies;Verbal Cueing   Recommended Route of Medication Administration   Medication Administration  Via  "Gastric Tube   Patient / Family Goals   Patient / Family Goal #1 \"I haven't eaten since yesterday\"   Goal #1 Outcome Progressing slower than expected   Short Term Goals   Short Term Goal # 1 Patient will consume prefeeding trials with SLP with no overt s/sx of aspiration.    Goal Outcome # 1 Progressing as expected         "

## 2021-02-06 NOTE — CARE PLAN
Problem: Bowel/Gastric:  Goal: Normal bowel function is maintained or improved  Outcome: PROGRESSING AS EXPECTED     Problem: Fluid Volume:  Goal: Will maintain balanced intake and output  Outcome: PROGRESSING AS EXPECTED     Problem: Safety:  Goal: Risk of aspiration will decrease  Outcome: PROGRESSING AS EXPECTED

## 2021-02-06 NOTE — PROGRESS NOTES
"Able to flush NGT after being irrigated about 300cc. Medication was administered and tube feeding started. Patient called to stop feeding. He states \"I feel very full. I will try later.\" 217 of tube feeding administered.   "

## 2021-02-06 NOTE — THERAPY
"Speech Language Pathology  Daily Treatment     Patient Name: Kerwin Wheat  Age:  86 y.o., Sex:  male  Medical Record #: [id number]  Today's Date: 2/5/2021     Precautions: (P) Swallow Precautions ( See Comments)  Comments: (P) Aspiration risk     Assessment    Patient was seen on this date for dysphagia treatment. Patient performed the following dysphagia exercises with good accuracy given min verbal cues: 3 reps head lift sustained (60 seconds), 2 reps reps head lift repetitive (30), and 15 reps falsetto. PO consisted of 10 single ice chips, 10 tsp applesauce, and 5-6 tsp pudding with cues for effortful swallow x3 per bolus (producing ~75% reps of effortful swallow). Patient required mod verbal and tactile cues to produce 3rd swallow with pt stating \"it's difficult.\" Patient had inconsistent throat clearing consistent with penetration seen during FEES performed yesterday. Written instructions provided to pt for dysphagia exercises to be performed frequently throughout the day.     Plan    Recommend continue NPO/morerak, OK for single ice chips (10-15/hour) with effortful swallow x3. SLP following closely for aggressive dysphagia therapy. Consider a modified barium swallow study early next week to determine progress and readiness for PO diet.     Continue current treatment plan.    Discharge Recommendations: Recommend post-acute placement for additional speech therapy services prior to discharge home     Objective       02/05/21 1632   Vitals   O2 Delivery Device None - Room Air   Dysphagia    Oral / Pharyngeal / Laryngeal Exercises Pharyngeal Constriction Exercises;Laryngeal Exercises;Base of Tongue Exercises   Other Treatments PO trials of ice chips, applesauce, and pudding    Diet / Liquid Recommendation NPO;Pre-Feeding Trials with SLP Only   Nursing Communication Swallow Precaution Sign Posted at Head of Bed   Skilled Intervention Compensatory Strategies;Verbal Cueing   Recommended Route of Medication " Administration   Medication Administration  Via Gastric Tube   Short Term Goals   Short Term Goal # 1 Patient will consume prefeeding trials with SLP with no overt s/sx of aspiration.    Goal Outcome # 1 Progressing as expected

## 2021-02-06 NOTE — PROGRESS NOTES
Attempted to flush Cortrack, but unsuccessful. Clog zapper administered. Will re-assess in 30 minutes.

## 2021-02-06 NOTE — PROGRESS NOTES
Blood pressure remains elevated after medication. Unable to flush Cortrack. Paged Hospitalist. Morning nurse informed

## 2021-02-06 NOTE — PROGRESS NOTES
Received bedside report and assumed care of patient. Pt is A&Ox4 and awake in bed. Patient showing no signs of distress, no complaints of pain, and is on RA. Tele monitor in place. All fall precautions are in place. Call light within reach, bed in low position, 2 side rails up, and belongings at bedside table.  Pt was updated on Plan of Care, no questions or concerns. Pt educated on use of call light for assistance. Will continue to monitor.

## 2021-02-07 ENCOUNTER — HOSPITAL ENCOUNTER (OUTPATIENT)
Dept: RADIOLOGY | Facility: MEDICAL CENTER | Age: 86
End: 2021-02-07
Payer: MEDICARE

## 2021-02-07 PROCEDURE — 92526 ORAL FUNCTION THERAPY: CPT

## 2021-02-07 PROCEDURE — 700102 HCHG RX REV CODE 250 W/ 637 OVERRIDE(OP): Performed by: INTERNAL MEDICINE

## 2021-02-07 PROCEDURE — 99232 SBSQ HOSP IP/OBS MODERATE 35: CPT | Performed by: INTERNAL MEDICINE

## 2021-02-07 PROCEDURE — A9270 NON-COVERED ITEM OR SERVICE: HCPCS | Performed by: INTERNAL MEDICINE

## 2021-02-07 PROCEDURE — 770020 HCHG ROOM/CARE - TELE (206)

## 2021-02-07 RX ORDER — CHOLECALCIFEROL (VITAMIN D3) 125 MCG
5 CAPSULE ORAL
Status: DISCONTINUED | OUTPATIENT
Start: 2021-02-07 | End: 2021-02-09 | Stop reason: HOSPADM

## 2021-02-07 RX ADMIN — LOSARTAN POTASSIUM 50 MG: 50 TABLET, FILM COATED ORAL at 06:00

## 2021-02-07 RX ADMIN — DILTIAZEM HYDROCHLORIDE 60 MG: 30 TABLET, FILM COATED ORAL at 05:59

## 2021-02-07 RX ADMIN — ATORVASTATIN CALCIUM 40 MG: 40 TABLET, FILM COATED ORAL at 17:29

## 2021-02-07 RX ADMIN — Medication 5 MG: at 21:30

## 2021-02-07 RX ADMIN — APIXABAN 5 MG: 5 TABLET, FILM COATED ORAL at 07:38

## 2021-02-07 RX ADMIN — DILTIAZEM HYDROCHLORIDE 60 MG: 30 TABLET, FILM COATED ORAL at 17:29

## 2021-02-07 RX ADMIN — APIXABAN 5 MG: 5 TABLET, FILM COATED ORAL at 21:30

## 2021-02-07 ASSESSMENT — ENCOUNTER SYMPTOMS
FLANK PAIN: 0
FOCAL WEAKNESS: 1
CHILLS: 0
FEVER: 0
SEIZURES: 0
SHORTNESS OF BREATH: 0
DIARRHEA: 0
SPEECH CHANGE: 1
COUGH: 0
WHEEZING: 0
SORE THROAT: 0
DIAPHORESIS: 0
MYALGIAS: 0
NECK PAIN: 0
BLOOD IN STOOL: 0
ABDOMINAL PAIN: 0
NAUSEA: 0
HEADACHES: 0
BRUISES/BLEEDS EASILY: 0
VOMITING: 0
DIZZINESS: 0
BACK PAIN: 0
PALPITATIONS: 0
SPUTUM PRODUCTION: 0
BLURRED VISION: 0

## 2021-02-07 NOTE — PROGRESS NOTES
[facility] Medicine Daily Progress Note    Date of Service  2/7/2021    Chief Complaint  86 y.o. male with a past medical history of hypertension, atrial fibrillation not on anticoagulation, stroke admitted 2/3/2021 with sudden onset of aphasia that started on 2/3/2021 at 3 PM    Hospital Course      Interval Problem Update  2/4 Patient reports some improvement of his aphasia continues to have significant dysarthria.  He also has facial asymmetry. He was evaluated by SLP and was noted to have some signs of aspiration therefore he was recommended to be kept n.p.o. with a FEES study.  His blood pressure remains elevated, he is past the window for permissive hypertension.  I have started him on losartan.  IV labetalol as needed for SBP greater than 160.  He will be continued on Lovenox at this time until he is able to tolerate p.o. intake at which time we will start him on Eliquis.    2/5 PT underwent FEES that revealed moderate oropharyngeal dysphagia and is at very high risk for aspiration.  Patient has agreed to Cortrak placement.  We will continue speech therapy in-house.  Patient has no other PT or OT needs therefore is not a candidate for inpatient rehab.  I discussed the case with neurology  who recommended restarting Eliquis for his atrial fibrillation and stroke.  No indication for repeat MRI.  Patient has a clinical diagnosis of stroke secondary to atrial fibrillation due to noncompliance with medication.    2/6 blood pressure and heart rate were elevated this morning.  I have started the patient on Cardizem.  Continue SLP.  Plan is to continue inpatient SLP and hopefully patient progresses to the point where we can remove Cortrak and discharge him home.    2/7 patient worked with SLP yesterday and was noted to be making progress in terms of his swallowing ability.  Continue inpatient SLP.  Blood pressure is better controlled today    Consultants/Specialty  None    Code Status  Full  Code    Disposition  Possible transfer to SNF for intensive speech and swallow therapy or neuro restorative vs home with home health vs home with outpatient SLP    May transfer to neuro    Review of Systems  Review of Systems   Constitutional: Negative for chills, diaphoresis and fever.   HENT: Negative for hearing loss and sore throat.    Eyes: Negative for blurred vision.   Respiratory: Negative for cough, sputum production, shortness of breath and wheezing.    Cardiovascular: Negative for chest pain, palpitations and leg swelling.   Gastrointestinal: Negative for abdominal pain, blood in stool, diarrhea, nausea and vomiting.   Genitourinary: Negative for dysuria, flank pain and urgency.   Musculoskeletal: Negative for back pain, joint pain, myalgias and neck pain.   Skin: Negative for rash.   Neurological: Positive for speech change and focal weakness. Negative for dizziness, seizures and headaches.   Endo/Heme/Allergies: Does not bruise/bleed easily.   Psychiatric/Behavioral: Negative for suicidal ideas.   All other systems reviewed and are negative.       Physical Exam  Temp:  [36.2 °C (97.2 °F)-36.6 °C (97.9 °F)] 36.2 °C (97.2 °F)  Pulse:  [61-75] 75  Resp:  [17-18] 18  BP: (127-156)/(77-98) 151/94  SpO2:  [95 %-99 %] 96 %    Physical Exam  Vitals signs and nursing note reviewed.   Constitutional:       General: He is not in acute distress.     Appearance: Normal appearance.   HENT:      Head: Normocephalic and atraumatic.      Nose: Nose normal.      Mouth/Throat:      Mouth: Mucous membranes are moist.   Eyes:      Extraocular Movements: Extraocular movements intact.      Conjunctiva/sclera: Conjunctivae normal.      Pupils: Pupils are equal, round, and reactive to light.   Neck:      Musculoskeletal: Normal range of motion and neck supple.   Cardiovascular:      Rate and Rhythm: Normal rate and regular rhythm.      Pulses: Normal pulses.      Heart sounds: Normal heart sounds.   Pulmonary:      Effort:  Pulmonary effort is normal. No respiratory distress.      Breath sounds: Normal breath sounds. No wheezing, rhonchi or rales.   Abdominal:      General: Bowel sounds are normal. There is no distension.      Palpations: Abdomen is soft.      Tenderness: There is no abdominal tenderness.   Musculoskeletal: Normal range of motion.         General: No swelling or tenderness.   Lymphadenopathy:      Cervical: No cervical adenopathy.   Skin:     General: Skin is warm.      Coloration: Skin is not jaundiced.      Findings: No rash.   Neurological:      General: No focal deficit present.      Mental Status: He is alert and oriented to person, place, and time.      Cranial Nerves: No cranial nerve deficit.      Motor: No weakness.      Comments: Right facial droop  Mild aphasia and moderate dysarthria  Strength and sensation intact of bilateral upper and lower extremities   Psychiatric:         Mood and Affect: Mood normal.         Behavior: Behavior normal.         Fluids    Intake/Output Summary (Last 24 hours) at 2/7/2021 1102  Last data filed at 2/7/2021 0600  Gross per 24 hour   Intake 1310 ml   Output 0 ml   Net 1310 ml       Laboratory  Recent Labs     02/05/21  0719 02/06/21  0753   WBC 6.0 7.0   RBC 4.89 5.46   HEMOGLOBIN 13.7* 15.4   HEMATOCRIT 42.0 45.6   MCV 85.9 83.5   MCH 28.0 28.2   MCHC 32.6* 33.8   RDW 46.7 44.4   PLATELETCT 208 236   MPV 12.1 12.3     Recent Labs     02/05/21  0719 02/06/21  0753   SODIUM 140 139   POTASSIUM 3.8 3.7   CHLORIDE 107 103   CO2 24 24   GLUCOSE 95 88   BUN 15 15   CREATININE 1.32 1.21   CALCIUM 8.8 9.6                   Imaging  OUTSIDE IMAGES-DX CHEST   Final Result      OUTSIDE IMAGES-CT HEAD   Final Result      OUTSIDE IMAGES-MR BRAIN   Final Result      DX-ABDOMEN FOR TUBE PLACEMENT   Final Result      Enteric tube projects over the distal stomach.      EC-ECHOCARDIOGRAM COMPLETE W/O CONT   Final Result      DX-ABDOMEN FOR TUBE PLACEMENT   Final Result      Enteric tube tip  projects over the stomach.           Assessment/Plan  * Stroke (HCC)- (present on admission)  Assessment & Plan  Stroke secondary to atrial fibrillation due to non compliance with anticoagulation medication  Aphasia, dysarthria, lower right sided facial droop and dysphagia. NIHSS 4  No other focal neuro deficits appreciated  Reports some improvement since initial onset  Has history of CVA in 2001  Hx of Afib, stopped his Eliquis in December 2020 on his own  MRI brain was negative for acute stroke but revealed old left occipital lobe and right lacunar infarcts  Discussed with Neurology Dr Acuna  Restart Eliquis via cortrak  Started on Lipitor 80 mg daily  BP control with Losartan 50    Essential hypertension  Assessment & Plan  Uncontrolled  Started on Cardizem  Continue Cozaar 50  IV labetalol as needed for SBP greater than 160    Hyperthyroidism  Assessment & Plan  TSH 4.31, FT4 1.06  - will need repeat TSH testing outpatient    A-fib (HCC)- (present on admission)  Assessment & Plan  Diagnosed w/ afib January 2020  Was started on eliquis at that time but discontinued it  currently rate controlled. Patient is refusing metoprolol states it causes severe sweating  Restarted on Eliquis  Started on cardizem  Counseled on importance of taking it consistently to prevent future strokes           VTE prophylaxis: Eliquis

## 2021-02-07 NOTE — THERAPY
Speech Language Pathology  Daily Treatment     Patient Name: Kerwin Wheat  Age:  86 y.o., Sex:  male  Medical Record #: [id number]  Today's Date: 2/7/2021     Precautions  Precautions: (P) Swallow Precautions ( See Comments)  Comments: Aspiration risk     Assessment    The patient was seen on this date for dysphagia therapy. The patient was provided with PO trials of ice chips, mildly thick liquids and liquidized textures. He completed an effortful swallow and double swallow following 80% of opportunities with min verbal cues. Throat clearing was appreciated in 75% of trials concerning for aspiration. The patient preformed the following dysphagia exercises: CTAR (2 reps at 30 seconds each), sustained head lift (60 seconds) and 15 reps of falsetto. The patient endorsed good follow through with each exercise which was confirmed by nursing staff. Recommend continue NPO with single ice chips with adherence to swallow strategies including effortful swallows and multiple swallows following each bite. The patient would benefit from a modified barium swallow study to determine progress and readiness for PO diet. MD and RN aware.     Plan    Continue current treatment plan.    Discharge Recommendations: (P) Recommend post-acute placement for additional speech therapy services prior to discharge home       Objective       02/07/21 1228   Dysphagia    Oral / Pharyngeal / Laryngeal Exercises Laryngeal Exercises;Pharyngeal Constriction Exercises;Base of Tongue Exercises   Other Treatments PO trials of ice chips, applesauce and MT2   Diet / Liquid Recommendation NPO;Pre-Feeding Trials with SLP Only   Nutritional Liquid Intake Rating Scale Nothing by mouth   Nutritional Food Intake Rating Scale Tube dependent with minimal attempts of oral intake   Nursing Communication Swallow Precaution Sign Posted at Head of Bed   Skilled Intervention Compensatory Strategies;Verbal Cueing   Recommended Route of Medication Administration  "  Medication Administration  Via Gastric Tube   Patient / Family Goals   Patient / Family Goal #1 \"I haven't eaten since yesterday\"   Goal #1 Outcome Progressing slower than expected   Short Term Goals   Short Term Goal # 1 Patient will consume prefeeding trials with SLP with no overt s/sx of aspiration.    Goal Outcome # 1 Progressing as expected         "

## 2021-02-07 NOTE — CARE PLAN
Problem: Communication  Goal: The ability to communicate needs accurately and effectively will improve  Outcome: PROGRESSING AS EXPECTED     Problem: Safety  Goal: Will remain free from injury  Outcome: PROGRESSING AS EXPECTED  Goal: Will remain free from falls  Outcome: PROGRESSING AS EXPECTED      Patient calling   Asking if we received a form from the state in order for him to obtain pcw. (personl care worker)

## 2021-02-07 NOTE — PROGRESS NOTES
Received Bedside report. Assumed care at 1900. This pt is AOx4, ambulatory with standby assistance, voiding adequately, 0/10 pain. Patient and RN discussed plan of care: questions answered. Labs noted, assessment complete, patient tolerating tube feeding. Tele box in place. Pt is on room air. Call light in place, fall precautions in place, patient educated on importance of calling for assistance. No additional needs at this time. VSS

## 2021-02-08 ENCOUNTER — APPOINTMENT (OUTPATIENT)
Dept: RADIOLOGY | Facility: MEDICAL CENTER | Age: 86
DRG: 066 | End: 2021-02-08
Attending: INTERNAL MEDICINE
Payer: MEDICARE

## 2021-02-08 LAB
CRP SERPL HS-MCNC: 0.2 MG/DL (ref 0–0.75)
PREALB SERPL-MCNC: 18.1 MG/DL (ref 18–38)

## 2021-02-08 PROCEDURE — A9270 NON-COVERED ITEM OR SERVICE: HCPCS | Performed by: INTERNAL MEDICINE

## 2021-02-08 PROCEDURE — 770020 HCHG ROOM/CARE - TELE (206)

## 2021-02-08 PROCEDURE — 99232 SBSQ HOSP IP/OBS MODERATE 35: CPT | Performed by: INTERNAL MEDICINE

## 2021-02-08 PROCEDURE — 84134 ASSAY OF PREALBUMIN: CPT

## 2021-02-08 PROCEDURE — 86140 C-REACTIVE PROTEIN: CPT

## 2021-02-08 PROCEDURE — 74230 X-RAY XM SWLNG FUNCJ C+: CPT

## 2021-02-08 PROCEDURE — 97535 SELF CARE MNGMENT TRAINING: CPT

## 2021-02-08 PROCEDURE — 700102 HCHG RX REV CODE 250 W/ 637 OVERRIDE(OP): Performed by: INTERNAL MEDICINE

## 2021-02-08 PROCEDURE — 92611 MOTION FLUOROSCOPY/SWALLOW: CPT

## 2021-02-08 RX ADMIN — APIXABAN 5 MG: 5 TABLET, FILM COATED ORAL at 07:55

## 2021-02-08 RX ADMIN — DILTIAZEM HYDROCHLORIDE 60 MG: 30 TABLET, FILM COATED ORAL at 18:00

## 2021-02-08 RX ADMIN — LOSARTAN POTASSIUM 50 MG: 50 TABLET, FILM COATED ORAL at 06:24

## 2021-02-08 RX ADMIN — DILTIAZEM HYDROCHLORIDE 60 MG: 30 TABLET, FILM COATED ORAL at 06:24

## 2021-02-08 RX ADMIN — APIXABAN 5 MG: 5 TABLET, FILM COATED ORAL at 20:52

## 2021-02-08 RX ADMIN — Medication 5 MG: at 20:52

## 2021-02-08 ASSESSMENT — ENCOUNTER SYMPTOMS
DIZZINESS: 0
SPEECH CHANGE: 1
WHEEZING: 0
FOCAL WEAKNESS: 1
FEVER: 0
VOMITING: 0
FLANK PAIN: 0
COUGH: 0
SHORTNESS OF BREATH: 0
BLURRED VISION: 0
SEIZURES: 0
SPUTUM PRODUCTION: 0
DIARRHEA: 0
MYALGIAS: 0
DIAPHORESIS: 0
CHILLS: 0
PALPITATIONS: 0
BLOOD IN STOOL: 0
BACK PAIN: 0
SORE THROAT: 0
NECK PAIN: 0
HEADACHES: 0
BRUISES/BLEEDS EASILY: 0
ABDOMINAL PAIN: 0
NAUSEA: 0

## 2021-02-08 ASSESSMENT — FIBROSIS 4 INDEX: FIB4 SCORE: 1.98

## 2021-02-08 NOTE — THERAPY
Speech Language Pathology   Video Swallow Evaluation     Patient Name: Kerwin Wheat  AGE:  86 y.o., SEX:  male  Medical Record #: [id number]  Today's Date: 2/8/2021     Precautions  Precautions: Swallow Precautions ( See Comments)  Comments: (P) Aspiration risk    Assessment  Patient seen this date for modified barium swallow study pending possible d/c home soon. Patient motivated and agreeable to procedure and transported down to radiology via MBS chair without incident. Patient consumed PO trials of MTL via tsp, cup sip, and straw, purees, pudding, soft solids, mixed consistencies, crackers, and thin liquids via tsp, cup sip, and straw. Patient presents with mild-moderate oral dysphagia and moderate pharyngeal dysphagia. Patient was noted to have weak BoT retraction, decreased pharyngeal constriction, decreased laryngeal elevation, and decreased sensation. Patient had laryngeal mistiming and moderate vallecular and pyriform sinus residue post-swallow, which appeared to cause deep laryngeal penetration after the swallow on thins via cup sip, aspiration during and after the swallow on thins via straw, and aspiration after the swallow from residue on MTL via cup and straw. However, aspiration on cup sip of MTL was noted to be after a sip of thins via straw which caused laryngeal penetration and left residue in the pyriforms and on the vocal cords and aspiration on cup sip of MTL appeared to be a result of same, as patient was instructed to take several more sips of MTL via cup and no aspiration occurred. Patient had subtle throat clearing throughout the study that only minimally increased with aspiration events. Patient also had penetration during and after the swallow on juice from fruit with mixed consistencies. Patient was able to clear most of residue with a second swallow and a strong throat clear/cough cleared aspirates from airway.     At this time, recommend patient cautiously initiate a modified  diet of soft solids and mildly thick liquids (SB6/MT2) w/ STRICT use of swallow precautions (i.e. sit up to 90 degrees, small cup sips, no straws, swallow 2-3 times per sip, and cough/throat clear post-swallow). Patient will need outpatient or  SLP services if patient discharges home soon.    Patient and son educated extensively at the bedside. Son and patient had several questions regarding dysphagia, POC, SLP recs, diet, swallow precautions, swallow strategies, etc. Patient appears very compliant, motivated, and aware of deficits. All questions answered and RN and MD updated as well.         Plan  Recommend Speech Therapy 3 times per week until therapy goals are met for the following treatments:  Dysphagia Training and Patient / Family / Caregiver Education.    Discharge Recommendations: Recommend home health or outpatient SLP for continued speech therapy services     Objective     02/08/21 1143   Precautions   Precautions Swallow Precautions ( See Comments)   Comments Aspiration risk   Vitals   O2 Delivery Device None - Room Air   Consistencies / Presentation Method   Consistencies / Presentation Method Tested   Mildly Thick (2) - (Nectar Thick) Teaspoon;Cup;Straw   Thin (0) Teaspoon;Cup;Straw   Liquidised (3) Teaspoon   Pureed (4) Teaspoon   Soft & Bite-Sized (6) - (Dysphagia III) Teaspoon   Regular - Easy to Chew (7)   (Cracker )   Regular (7) Teaspoon   Oral Phase   Mildly Thick (2) - (Nectar Thick) Oral Residue After the Swallow;Premature Spillage Into Valleculae;Premature Spillage Into Lateral Channels   Thin (0) Oral Residue After the Swallow;Premature Spillage Into Pyriform Sinus   Liquidised (3) Premature Spillage Into Valleculae;Oral Residue After the Swallow   Pureed (4) Premature Spillage Into Valleculae;Oral Residue After the Swallow   Soft & Bite-Sized (6) - (Dysphagia III) Premature Spillage Into Valleculae;Oral Residue After the Swallow   Regular-Easy to Chew (7) Oral Residue After the  Swallow;Premature Spillage Into Valleculae   Regular (7) Premature Spillage Into Pyriform Sinus;Oral Residue After the Swallow   Pharyngeal Phase   Mildly Thick (2) - (Nectar Thick) Delayed Onset of Swallow;Reduced Tongue Base Retraction;Residue In Valleculae;Reduced Hyo-Laryngeal Elevation;Penetration After Swallow;Aspiration After Swallow;Delayed Cough Response to Penetration / Aspiration    Thin (0) Delayed Onset of Swallow;Reduced Tongue Base Retraction;Residue In Valleculae;Reduced Hyo-Laryngeal Elevation;Aspiration During Swallow;Aspiration After Swallow;Delayed Cough Response to Penetration / Aspiration ;Penetration After Swallow   Liquidised (3) Delayed Onset of Swallow;Residue In Valleculae;Reduced Hyo-Laryngeal Elevation   Pureed (4) Delayed Onset of Swallow;Residue In Valleculae;Reduced Hyo-Laryngeal Elevation   Soft & Bite-Sized (6) - (Dysphagia III) Delayed Onset of Swallow;Residue In Valleculae;Reduced Hyo-Laryngeal Elevation   Regular-Easy to Chew (7) Delayed Onset of Swallow;Residue In Valleculae;Reduced Hyo-Laryngeal Elevation   Regular (7) Delayed Onset of Swallow;Residue In Valleculae;Reduced Hyo-Laryngeal Elevation;Penetration After Swallow   Esophageal Phase   Esophageal Phase Comments ? cervical osteophytes at C4-C5, but patient denies symptoms    Compensatory Strategies Attempted   Compensatory Strategies Attempted Yes   Multiple Swallows Cleared most of residue    Effortful Swallows Minimally effective    Cough / Clear After Swallow Strong and effective in clearing aspirates    Penetration Aspiration Scale   Penetration Aspiration Scale 6 - Material passes glottis but is ejected from airway, no visible subglottic stasis   Impression   Oral - Pharyngeal Mild - Moderate Impairment   Pharyngeal Moderate Impairment   Prognosis   Prognosis for Improvement Good   Barriers to Improvement Elderly; hx of CVAs   Positive Indicators for Improvement Motivated, cognitively intact; compliant     Recommendations   Diet / Liquid Recommendation Soft & Bite-Sized (6) - (Dysphagia III);Mildly Thick (2) - (Nectar Thick)   Medication Administration  Float Whole with Puree   Strategies / Precautions Small Bites;Small Sips;No Straws;Multiple Swallows;Cough / Clear Throat After Swallowing;Sitting Upright at 90 Degrees while Eating   Interventions Dysphagia Therapy by SLP;Pharyngeal - Laryngeal Strengthening Exercises;Follow Up Video Swallow;Patient / Caregiver Education / Training   Short Term Goals   Short Term Goal # 1 Patient will consume prefeeding trials with SLP with no overt s/sx of aspiration.    Goal Outcome # 1 Goal met, new goal added   Short Term Goal # 1 B  NEW 2/8: Patient will consume SB6/MT2 diet with intermittent supervision and strict use of strategies with no overt s/sx of aspiration.    Anticipated Discharge Needs   Discharge Recommendations Recommend home health for continued speech therapy services

## 2021-02-08 NOTE — CARE PLAN
Problem: Fluid Volume:  Goal: Will maintain balanced intake and output  Outcome: PROGRESSING AS EXPECTED     Problem: Safety:  Goal: Risk of aspiration will decrease  Outcome: PROGRESSING AS EXPECTED     Problem: Safety  Goal: Will remain free from injury  Outcome: PROGRESSING AS EXPECTED  Goal: Will remain free from falls  Outcome: PROGRESSING AS EXPECTED

## 2021-02-08 NOTE — PROGRESS NOTES
[facility] Medicine Daily Progress Note    Date of Service  2/8/2021    Chief Complaint  86 y.o. male with a past medical history of hypertension, atrial fibrillation not on anticoagulation, stroke admitted 2/3/2021 with sudden onset of aphasia that started on 2/3/2021 at 3 PM    Hospital Course      Interval Problem Update  2/4 Patient reports some improvement of his aphasia continues to have significant dysarthria.  He also has facial asymmetry. He was evaluated by SLP and was noted to have some signs of aspiration therefore he was recommended to be kept n.p.o. with a FEES study.  His blood pressure remains elevated, he is past the window for permissive hypertension.  I have started him on losartan.  IV labetalol as needed for SBP greater than 160.  He will be continued on Lovenox at this time until he is able to tolerate p.o. intake at which time we will start him on Eliquis.    2/5 PT underwent FEES that revealed moderate oropharyngeal dysphagia and is at very high risk for aspiration.  Patient has agreed to Cortrak placement.  We will continue speech therapy in-house.  Patient has no other PT or OT needs therefore is not a candidate for inpatient rehab.  I discussed the case with neurology  who recommended restarting Eliquis for his atrial fibrillation and stroke.  No indication for repeat MRI.  Patient has a clinical diagnosis of stroke secondary to atrial fibrillation due to noncompliance with medication.    2/6 blood pressure and heart rate were elevated this morning.  I have started the patient on Cardizem.  Continue SLP.  Plan is to continue inpatient SLP and hopefully patient progresses to the point where we can remove Cortrak and discharge him home.    2/7 patient worked with SLP yesterday and was noted to be making progress in terms of his swallowing ability.  Continue inpatient SLP.  Blood pressure is better controlled today    2/8 patient underwent modified barium swallow today and has been  recommended to cautiously initiate a modified diet with strict swallow precautions.  SLP has recommended another day of speech therapy and will evaluate tomorrow with plans to hopefully discharge the patient with ongoing outpatient speech therapy    Consultants/Specialty  None    Code Status  Full Code    Disposition   home with outpatient SLP tomorrow    May transfer to neuro    Review of Systems  Review of Systems   Constitutional: Negative for chills, diaphoresis and fever.   HENT: Negative for hearing loss and sore throat.    Eyes: Negative for blurred vision.   Respiratory: Negative for cough, sputum production, shortness of breath and wheezing.    Cardiovascular: Negative for chest pain, palpitations and leg swelling.   Gastrointestinal: Negative for abdominal pain, blood in stool, diarrhea, nausea and vomiting.   Genitourinary: Negative for dysuria, flank pain and urgency.   Musculoskeletal: Negative for back pain, joint pain, myalgias and neck pain.   Skin: Negative for rash.   Neurological: Positive for speech change and focal weakness. Negative for dizziness, seizures and headaches.   Endo/Heme/Allergies: Does not bruise/bleed easily.   Psychiatric/Behavioral: Negative for suicidal ideas.   All other systems reviewed and are negative.       Physical Exam  Temp:  [36.2 °C (97.2 °F)-37 °C (98.6 °F)] 36.4 °C (97.6 °F)  Pulse:  [61-99] 99  Resp:  [15-18] 15  BP: (126-151)/(60-92) 141/60  SpO2:  [95 %-97 %] 95 %    Physical Exam  Vitals signs and nursing note reviewed.   Constitutional:       General: He is not in acute distress.     Appearance: Normal appearance.   HENT:      Head: Normocephalic and atraumatic.      Nose: Nose normal.      Mouth/Throat:      Mouth: Mucous membranes are moist.   Eyes:      Extraocular Movements: Extraocular movements intact.      Conjunctiva/sclera: Conjunctivae normal.      Pupils: Pupils are equal, round, and reactive to light.   Neck:      Musculoskeletal: Normal range of  motion and neck supple.   Cardiovascular:      Rate and Rhythm: Normal rate and regular rhythm.      Pulses: Normal pulses.      Heart sounds: Normal heart sounds.   Pulmonary:      Effort: Pulmonary effort is normal. No respiratory distress.      Breath sounds: Normal breath sounds. No wheezing, rhonchi or rales.   Abdominal:      General: Bowel sounds are normal. There is no distension.      Palpations: Abdomen is soft.      Tenderness: There is no abdominal tenderness.   Musculoskeletal: Normal range of motion.         General: No swelling or tenderness.   Lymphadenopathy:      Cervical: No cervical adenopathy.   Skin:     General: Skin is warm.      Coloration: Skin is not jaundiced.      Findings: No rash.   Neurological:      General: No focal deficit present.      Mental Status: He is alert and oriented to person, place, and time.      Cranial Nerves: No cranial nerve deficit.      Motor: No weakness.      Comments:   Strength and sensation intact of bilateral upper and lower extremities   Psychiatric:         Mood and Affect: Mood normal.         Behavior: Behavior normal.         Fluids    Intake/Output Summary (Last 24 hours) at 2/8/2021 1418  Last data filed at 2/8/2021 0800  Gross per 24 hour   Intake 1430 ml   Output 0 ml   Net 1430 ml       Laboratory  Recent Labs     02/06/21  0753   WBC 7.0   RBC 5.46   HEMOGLOBIN 15.4   HEMATOCRIT 45.6   MCV 83.5   MCH 28.2   MCHC 33.8   RDW 44.4   PLATELETCT 236   MPV 12.3     Recent Labs     02/06/21  0753   SODIUM 139   POTASSIUM 3.7   CHLORIDE 103   CO2 24   GLUCOSE 88   BUN 15   CREATININE 1.21   CALCIUM 9.6                   Imaging  OUTSIDE IMAGES-DX CHEST   Final Result      OUTSIDE IMAGES-CT HEAD   Final Result      OUTSIDE IMAGES-MR BRAIN   Final Result      DX-ABDOMEN FOR TUBE PLACEMENT   Final Result      Enteric tube projects over the distal stomach.      EC-ECHOCARDIOGRAM COMPLETE W/O CONT   Final Result      DX-ABDOMEN FOR TUBE PLACEMENT   Final  Result      Enteric tube tip projects over the stomach.      DX-ESOPHAGUS - [id number]    (Results Pending)        Assessment/Plan  * Stroke (HCC)- (present on admission)  Assessment & Plan  Stroke secondary to atrial fibrillation due to non compliance with anticoagulation medication  Aphasia, dysarthria, lower right sided facial droop and dysphagia. NIHSS 4  No other focal neuro deficits appreciated  Reports some improvement since initial onset  Has history of CVA in 2001  Hx of Afib, stopped his Eliquis in December 2020 on his own  MRI brain was negative for acute stroke but revealed old left occipital lobe and right lacunar infarcts  Discussed with Neurology Dr Acuna  Restart Eliquis   Started on Lipitor 80 mg daily  BP control with Losartan 50  Continue speech therapy    Essential hypertension  Assessment & Plan  Uncontrolled  Started on Cardizem  Continue Cozaar 50  IV labetalol as needed for SBP greater than 160    Hyperthyroidism  Assessment & Plan  TSH 4.31, FT4 1.06  - will need repeat TSH testing outpatient    A-fib (HCC)- (present on admission)  Assessment & Plan  Diagnosed w/ afib January 2020  Was started on eliquis at that time but discontinued it  currently rate controlled. Patient is refusing metoprolol states it causes severe sweating  Restarted on Eliquis  Started on cardizem  Counseled on importance of taking it consistently to prevent future strokes           VTE prophylaxis: Eliquis

## 2021-02-08 NOTE — CARE PLAN
Problem: Nutritional:  Goal: Nutrition support tolerated and meeting greater than 85% of estimated needs  Outcome: MET     TF @ 450 mL at meal times + 200 mL HS (goal rate) per flow sheet

## 2021-02-09 VITALS
HEART RATE: 86 BPM | TEMPERATURE: 97.9 F | OXYGEN SATURATION: 97 % | DIASTOLIC BLOOD PRESSURE: 68 MMHG | SYSTOLIC BLOOD PRESSURE: 126 MMHG | RESPIRATION RATE: 18 BRPM | BODY MASS INDEX: 20.04 KG/M2 | WEIGHT: 147.93 LBS | HEIGHT: 72 IN

## 2021-02-09 LAB
ANION GAP SERPL CALC-SCNC: 11 MMOL/L (ref 7–16)
BASOPHILS # BLD AUTO: 0.7 % (ref 0–1.8)
BASOPHILS # BLD: 0.06 K/UL (ref 0–0.12)
BUN SERPL-MCNC: 25 MG/DL (ref 8–22)
CALCIUM SERPL-MCNC: 9.4 MG/DL (ref 8.5–10.5)
CHLORIDE SERPL-SCNC: 102 MMOL/L (ref 96–112)
CO2 SERPL-SCNC: 26 MMOL/L (ref 20–33)
CREAT SERPL-MCNC: 1.39 MG/DL (ref 0.5–1.4)
EOSINOPHIL # BLD AUTO: 0.29 K/UL (ref 0–0.51)
EOSINOPHIL NFR BLD: 3.5 % (ref 0–6.9)
ERYTHROCYTE [DISTWIDTH] IN BLOOD BY AUTOMATED COUNT: 45.3 FL (ref 35.9–50)
GLUCOSE SERPL-MCNC: 104 MG/DL (ref 65–99)
HCT VFR BLD AUTO: 46.8 % (ref 42–52)
HGB BLD-MCNC: 15.4 G/DL (ref 14–18)
IMM GRANULOCYTES # BLD AUTO: 0.04 K/UL (ref 0–0.11)
IMM GRANULOCYTES NFR BLD AUTO: 0.5 % (ref 0–0.9)
LYMPHOCYTES # BLD AUTO: 1.98 K/UL (ref 1–4.8)
LYMPHOCYTES NFR BLD: 24.1 % (ref 22–41)
MCH RBC QN AUTO: 27.9 PG (ref 27–33)
MCHC RBC AUTO-ENTMCNC: 32.9 G/DL (ref 33.7–35.3)
MCV RBC AUTO: 84.9 FL (ref 81.4–97.8)
MONOCYTES # BLD AUTO: 0.76 K/UL (ref 0–0.85)
MONOCYTES NFR BLD AUTO: 9.3 % (ref 0–13.4)
NEUTROPHILS # BLD AUTO: 5.08 K/UL (ref 1.82–7.42)
NEUTROPHILS NFR BLD: 61.9 % (ref 44–72)
NRBC # BLD AUTO: 0 K/UL
NRBC BLD-RTO: 0 /100 WBC
PLATELET # BLD AUTO: 238 K/UL (ref 164–446)
PMV BLD AUTO: 12.2 FL (ref 9–12.9)
POTASSIUM SERPL-SCNC: 4.9 MMOL/L (ref 3.6–5.5)
RBC # BLD AUTO: 5.51 M/UL (ref 4.7–6.1)
SODIUM SERPL-SCNC: 139 MMOL/L (ref 135–145)
WBC # BLD AUTO: 8.2 K/UL (ref 4.8–10.8)

## 2021-02-09 PROCEDURE — 85025 COMPLETE CBC W/AUTO DIFF WBC: CPT

## 2021-02-09 PROCEDURE — 99239 HOSP IP/OBS DSCHRG MGMT >30: CPT | Performed by: INTERNAL MEDICINE

## 2021-02-09 PROCEDURE — 80048 BASIC METABOLIC PNL TOTAL CA: CPT

## 2021-02-09 PROCEDURE — A9270 NON-COVERED ITEM OR SERVICE: HCPCS | Performed by: INTERNAL MEDICINE

## 2021-02-09 PROCEDURE — 700102 HCHG RX REV CODE 250 W/ 637 OVERRIDE(OP): Performed by: INTERNAL MEDICINE

## 2021-02-09 PROCEDURE — 92526 ORAL FUNCTION THERAPY: CPT

## 2021-02-09 RX ORDER — ATORVASTATIN CALCIUM 40 MG/1
40 TABLET, FILM COATED ORAL EVERY EVENING
Qty: 30 TAB | Refills: 0 | Status: SHIPPED | OUTPATIENT
Start: 2021-02-09 | End: 2021-03-01 | Stop reason: SDUPTHER

## 2021-02-09 RX ORDER — LOSARTAN POTASSIUM 50 MG/1
50 TABLET ORAL DAILY
Qty: 30 TAB | Refills: 0 | Status: SHIPPED | OUTPATIENT
Start: 2021-02-10 | End: 2021-03-01 | Stop reason: SDUPTHER

## 2021-02-09 RX ORDER — DILTIAZEM HYDROCHLORIDE 60 MG/1
60 TABLET, FILM COATED ORAL 2 TIMES DAILY
Qty: 120 TAB | Refills: 0 | Status: SHIPPED | OUTPATIENT
Start: 2021-02-09 | End: 2021-03-19

## 2021-02-09 RX ADMIN — DILTIAZEM HYDROCHLORIDE 60 MG: 30 TABLET, FILM COATED ORAL at 06:34

## 2021-02-09 RX ADMIN — LOSARTAN POTASSIUM 50 MG: 50 TABLET, FILM COATED ORAL at 06:34

## 2021-02-09 RX ADMIN — APIXABAN 5 MG: 5 TABLET, FILM COATED ORAL at 09:30

## 2021-02-09 ASSESSMENT — PAIN DESCRIPTION - PAIN TYPE: TYPE: ACUTE PAIN

## 2021-02-09 NOTE — THERAPY
Speech Language Pathology  Daily Treatment     Patient Name: Kerwin Wheat  Age:  86 y.o., Sex:  male  Medical Record #: [id number]  Today's Date: 2/9/2021    Precautions: (P) Fall Risk, Swallow Precautions ( See Comments)  Comments: (P) Aspiration risk     Assessment    Patient was seen on this date for dysphagia treatment s/p initiation of modified diet. Pt sitting out of bed in a chair for session. Patient retrained on swallow strategies (sit up 90* or in chair, single sips/bites, double swallow, and throat clear/cough) and were written out for pt to take home. PO consisted of peaches (4 oz) and mildly thick liquid (3 oz) which resulted in no overt s/sx of aspiration with pt requiring min verbal cues to perform double swallow. Otherwise, throat clearing and cough was volitional per pt report indicating adherence to swallow precautions. Handouts provided regarding explanation of diet and liquid modification.     Plan    Recommend continue modified diet of soft solids and mildly thick liquids (SB6/MT2) w/ STRICT use of swallow precautions (i.e. sit up to 90 degrees, small cup sips, no straws, swallow 2-3 times per sip, and cough/throat clear post-swallow). OK to pull cortrak if OK with MD. Patient will need outpatient or  SLP services if patient discharges home soon.    Continue current treatment plan.    Discharge Recommendations: Recommend home health for continued speech therapy services     Objective       02/09/21 1016   Vitals   O2 Delivery Device None - Room Air   Dysphagia    Positioning / Behavior Modification Modulate Rate or Bite Size;Self Monitoring   Other Treatments PO trials of MTL and peaches   Diet / Liquid Recommendation Soft & Bite-Sized (6) - (Dysphagia III);Mildly Thick (2) - (Nectar Thick)   Skilled Intervention Compensatory Strategies;Verbal Cueing   Short Term Goals   Short Term Goal # 2 NEW 2/8: Patient will consume SB6/MT2 diet with intermittent supervision and strict use of  strategies with no overt s/sx of aspiration.    Goal Outcome # 2  Progressing as expected   Education Group   Education Provided Dysphagia   Additional Comments Pt verbalized understanding; multiple handouts provided regarding diet modification and written swallow precautions

## 2021-02-09 NOTE — PROGRESS NOTES
Discharge paperwork discussed with the patient. Signed copy placed in the chart, patient copy provided. RN explained importance of setting up outpatient speech therapy again with patient pt verbalized understanding. Pt waiting for his ride in d/c [facility].

## 2021-02-09 NOTE — DISCHARGE INSTRUCTIONS
Discharge Instructions    Discharged to home by car with relative. Discharged via wheelchair, hospital escort: Yes.  Special equipment needed: Not Applicable    Be sure to schedule a follow-up appointment with your primary care doctor or any specialists as instructed.     Discharge Plan:   Diet Plan: Discussed  Activity Level: Discussed  Confirmed Follow up Appointment: Patient to Call and Schedule Appointment  Confirmed Symptoms Management: Discussed  Medication Reconciliation Updated: Yes  Influenza Vaccine Indication: Patient Refuses    I understand that a diet low in cholesterol, fat, and sodium is recommended for good health. Unless I have been given specific instructions below for another diet, I accept this instruction as my diet prescription.   Other diet: soft and bite size mildly thick liquids no straws     Special Instructions:   Stroke Prevention  Some medical conditions and behaviors are associated with a higher chance of having a stroke. You can help prevent a stroke by making nutrition, lifestyle, and other changes, including managing any medical conditions you may have.  What nutrition changes can be made?    · Eat healthy foods. You can do this by:  ? Choosing foods high in fiber, such as fresh fruits and vegetables and whole grains.  ? Eating at least 5 or more servings of fruits and vegetables a day. Try to fill half of your plate at each meal with fruits and vegetables.  ? Choosing lean protein foods, such as lean cuts of meat, poultry without skin, fish, tofu, beans, and nuts.  ? Eating low-fat dairy products.  ? Avoiding foods that are high in salt (sodium). This can help lower blood pressure.  ? Avoiding foods that have saturated fat, trans fat, and cholesterol. This can help prevent high cholesterol.  ? Avoiding processed and premade foods.  · Follow your health care provider's specific guidelines for losing weight, controlling high blood pressure (hypertension), lowering high cholesterol,  and managing diabetes. These may include:  ? Reducing your daily calorie intake.  ? Limiting your daily sodium intake to 1,500 milligrams (mg).  ? Using only healthy fats for cooking, such as olive oil, canola oil, or sunflower oil.  ? Counting your daily carbohydrate intake.  What lifestyle changes can be made?  · Maintain a healthy weight. Talk to your health care provider about your ideal weight.  · Get at least 30 minutes of moderate physical activity at least 5 days a week. Moderate activity includes brisk walking, biking, and swimming.  · Do not use any products that contain nicotine or tobacco, such as cigarettes and e-cigarettes. If you need help quitting, ask your health care provider. It may also be helpful to avoid exposure to secondhand smoke.  · Limit alcohol intake to no more than 1 drink a day for nonpregnant women and 2 drinks a day for men. One drink equals 12 oz of beer, 5 oz of wine, or 1½ oz of hard liquor.  · Stop any illegal drug use.  · Avoid taking birth control pills. Talk to your health care provider about the risks of taking birth control pills if:  ? You are over 35 years old.  ? You smoke.  ? You get migraines.  ? You have ever had a blood clot.  What other changes can be made?  · Manage your cholesterol levels.  ? Eating a healthy diet is important for preventing high cholesterol. If cholesterol cannot be managed through diet alone, you may also need to take medicines.  ? Take any prescribed medicines to control your cholesterol as told by your health care provider.  · Manage your diabetes.  ? Eating a healthy diet and exercising regularly are important parts of managing your blood sugar. If your blood sugar cannot be managed through diet and exercise, you may need to take medicines.  ? Take any prescribed medicines to control your diabetes as told by your health care provider.  · Control your hypertension.  ? To reduce your risk of stroke, try to keep your blood pressure below  130/80.  ? Eating a healthy diet and exercising regularly are an important part of controlling your blood pressure. If your blood pressure cannot be managed through diet and exercise, you may need to take medicines.  ? Take any prescribed medicines to control hypertension as told by your health care provider.  ? Ask your health care provider if you should monitor your blood pressure at home.  ? Have your blood pressure checked every year, even if your blood pressure is normal. Blood pressure increases with age and some medical conditions.  · Get evaluated for sleep disorders (sleep apnea). Talk to your health care provider about getting a sleep evaluation if you snore a lot or have excessive sleepiness.  · Take over-the-counter and prescription medicines only as told by your health care provider. Aspirin or blood thinners (antiplatelets or anticoagulants) may be recommended to reduce your risk of forming blood clots that can lead to stroke.  · Make sure that any other medical conditions you have, such as atrial fibrillation or atherosclerosis, are managed.  What are the warning signs of a stroke?  The warning signs of a stroke can be easily remembered as BEFAST.  · B is for balance. Signs include:  ? Dizziness.  ? Loss of balance or coordination.  ? Sudden trouble walking.  · E is for eyes. Signs include:  ? A sudden change in vision.  ? Trouble seeing.  · F is for face. Signs include:  ? Sudden weakness or numbness of the face.  ? The face or eyelid drooping to one side.  · A is for arms. Signs include:  ? Sudden weakness or numbness of the arm, usually on one side of the body.  · S is for speech. Signs include:  ? Trouble speaking (aphasia).  ? Trouble understanding.  · T is for time.  ? These symptoms may represent a serious problem that is an emergency. Do not wait to see if the symptoms will go away. Get medical help right away. Call your local emergency services (911 in the [place] Do not drive yourself to the  hospital.  · Other signs of stroke may include:  ? A sudden, severe headache with no known cause.  ? Nausea or vomiting.  ? Seizure.  Where to find more information  For more information, visit:  · American Stroke Association: www.strokeassociation.org  · National Stroke Association: www.stroke.org  Summary  · You can prevent a stroke by eating healthy, exercising, not smoking, limiting alcohol intake, and managing any medical conditions you may have.  · Do not use any products that contain nicotine or tobacco, such as cigarettes and e-cigarettes. If you need help quitting, ask your health care provider. It may also be helpful to avoid exposure to secondhand smoke.  · Remember BEFAST for warning signs of stroke. Get help right away if you or a loved one has any of these signs.  This information is not intended to replace advice given to you by your health care provider. Make sure you discuss any questions you have with your health care provider.  Document Released: 01/25/2006 Document Revised: 11/30/2018 Document Reviewed: 01/23/2018  [employer] Patient Education © 2020 [employer]        Atrial Fibrillation    Atrial fibrillation is a type of heartbeat that is irregular or fast (rapid). If you have this condition, your heart beats without any order. This makes it hard for your heart to pump blood in a normal way. Having this condition gives you more risk for stroke, heart failure, and other heart problems.  Atrial fibrillation may start all of a sudden and then stop on its own, or it may become a long-lasting problem.  What are the causes?  This condition may be caused by heart conditions, such as:  · High blood pressure.  · Heart failure.  · Heart valve disease.  · Heart surgery.  Other causes include:  · Pneumonia.  · Obstructive sleep apnea.  · Lung cancer.  · Thyroid disease.  · Drinking too much alcohol.  Sometimes the cause is not known.  What increases the risk?  You are more likely to develop this condition  "if:  · You smoke.  · You are older.  · You have diabetes.  · You are overweight.  · You have a family history of this condition.  · You exercise often and hard.  What are the signs or symptoms?  Common symptoms of this condition include:  · A feeling like your heart is beating very fast.  · Chest pain.  · Feeling short of breath.  · Feeling light-headed or weak.  · Getting tired easily.  Follow these instructions at home:  Medicines  · Take over-the-counter and prescription medicines only as told by your doctor.  · If your doctor gives you a blood-thinning medicine, take it exactly as told. Taking too much of it can cause bleeding. Taking too little of it does not protect you against clots. Clots can cause a stroke.  Lifestyle         · Do not use any tobacco products. These include cigarettes, chewing tobacco, and e-cigarettes. If you need help quitting, ask your doctor.  · Do not drink alcohol.  · Do not drink beverages that have caffeine. These include coffee, soda, and tea.  · Follow diet instructions as told by your doctor.  · Exercise regularly as told by your doctor.  General instructions  · If you have a condition that causes breathing to stop for a short period of time (apnea), treat it as told by your doctor.  · Keep a healthy weight. Do not use diet pills unless your doctor says they are safe for you. Diet pills may make heart problems worse.  · Keep all follow-up visits as told by your doctor. This is important.  Contact a doctor if:  · You notice a change in the speed, rhythm, or strength of your heartbeat.  · You are taking a blood-thinning medicine and you see more bruising.  · You get tired more easily when you move or exercise.  · You have a sudden change in weight.  Get help right away if:    · You have pain in your chest or your belly (abdomen).  · You have trouble breathing.  · You have blood in your vomit, poop, or pee (urine).  · You have any signs of a stroke. \"BE FAST\" is an easy way to " remember the main warning signs:  ? B - Balance. Signs are dizziness, sudden trouble walking, or loss of balance.  ? E - Eyes. Signs are trouble seeing or a change in how you see.  ? F - Face. Signs are sudden weakness or loss of feeling in the face, or the face or eyelid drooping on one side.  ? A - Arms. Signs are weakness or loss of feeling in an arm. This happens suddenly and usually on one side of the body.  ? S - Speech. Signs are sudden trouble speaking, slurred speech, or trouble understanding what people say.  ? T - Time. Time to call emergency services. Write down what time symptoms started.  · You have other signs of a stroke, such as:  ? A sudden, very bad headache with no known cause.  ? Feeling sick to your stomach (nausea).  ? Throwing up (vomiting).  ? Jerky movements you cannot control (seizure).  These symptoms may be an emergency. Do not wait to see if the symptoms will go away. Get medical help right away. Call your local emergency services (911 in the [place] Do not drive yourself to the hospital.  Summary  · Atrial fibrillation is a type of heartbeat that is irregular or fast (rapid).  · You are at higher risk of this condition if you smoke, are older, have diabetes, or are overweight.  · Follow your doctor's instructions about medicines, diet, exercise, and follow-up visits.  · Get help right away if you think that you have signs of a stroke.  This information is not intended to replace advice given to you by your health care provider. Make sure you discuss any questions you have with your health care provider.  Document Released: 09/26/2009 Document Revised: 02/21/2019 Document Reviewed: 02/08/2019  Elsevier Patient Education © 2020 [employer].    Hypertension, Adult  Hypertension is another name for high blood pressure. High blood pressure forces your heart to work harder to pump blood. This can cause problems over time.  There are two numbers in a blood pressure reading. There is a top  number (systolic) over a bottom number (diastolic). It is best to have a blood pressure that is below 120/80. Healthy choices can help lower your blood pressure, or you may need medicine to help lower it.  What are the causes?  The cause of this condition is not known. Some conditions may be related to high blood pressure.  What increases the risk?  · Smoking.  · Having type 2 diabetes mellitus, high cholesterol, or both.  · Not getting enough exercise or physical activity.  · Being overweight.  · Having too much fat, sugar, calories, or salt (sodium) in your diet.  · Drinking too much alcohol.  · Having long-term (chronic) kidney disease.  · Having a family history of high blood pressure.  · Age. Risk increases with age.  · Race. You may be at higher risk if you are .  · Gender. Men are at higher risk than women before age 45. After age 65, women are at higher risk than men.  · Having obstructive sleep apnea.  · Stress.  What are the signs or symptoms?  · High blood pressure may not cause symptoms. Very high blood pressure (hypertensive crisis) may cause:  ? Headache.  ? Feelings of worry or nervousness (anxiety).  ? Shortness of breath.  ? Nosebleed.  ? A feeling of being sick to your stomach (nausea).  ? Throwing up (vomiting).  ? Changes in how you see.  ? Very bad chest pain.  ? Seizures.  How is this treated?  · This condition is treated by making healthy lifestyle changes, such as:  ? Eating healthy foods.  ? Exercising more.  ? Drinking less alcohol.  · Your health care provider may prescribe medicine if lifestyle changes are not enough to get your blood pressure under control, and if:  ? Your top number is above 130.  ? Your bottom number is above 80.  · Your personal target blood pressure may vary.  Follow these instructions at home:  Eating and drinking    · If told, follow the DASH eating plan. To follow this plan:  ? Fill one half of your plate at each meal with fruits and  vegetables.  ? Fill one fourth of your plate at each meal with whole grains. Whole grains include whole-wheat pasta, brown rice, and whole-grain bread.  ? Eat or drink low-fat dairy products, such as skim milk or low-fat yogurt.  ? Fill one fourth of your plate at each meal with low-fat (lean) proteins. Low-fat proteins include fish, chicken without skin, eggs, beans, and tofu.  ? Avoid fatty meat, cured and processed meat, or chicken with skin.  ? Avoid pre-made or processed food.  · Eat less than 1,500 mg of salt each day.  · Do not drink alcohol if:  ? Your doctor tells you not to drink.  ? You are pregnant, may be pregnant, or are planning to become pregnant.  · If you drink alcohol:  ? Limit how much you use to:  § 0-1 drink a day for women.  § 0-2 drinks a day for men.  ? Be aware of how much alcohol is in your drink. In the [place] one drink equals one 12 oz bottle of beer (355 mL), one 5 oz glass of wine (148 mL), or one 1½ oz glass of hard liquor (44 mL).  Lifestyle    · Work with your doctor to stay at a healthy weight or to lose weight. Ask your doctor what the best weight is for you.  · Get at least 30 minutes of exercise most days of the week. This may include walking, swimming, or biking.  · Get at least 30 minutes of exercise that strengthens your muscles (resistance exercise) at least 3 days a week. This may include lifting weights or doing Pilates.  · Do not use any products that contain nicotine or tobacco, such as cigarettes, e-cigarettes, and chewing tobacco. If you need help quitting, ask your doctor.  · Check your blood pressure at home as told by your doctor.  · Keep all follow-up visits as told by your doctor. This is important.  Medicines  · Take over-the-counter and prescription medicines only as told by your doctor. Follow directions carefully.  · Do not skip doses of blood pressure medicine. The medicine does not work as well if you skip doses. Skipping doses also puts you at risk for  problems.  · Ask your doctor about side effects or reactions to medicines that you should watch for.  Contact a doctor if you:  · Think you are having a reaction to the medicine you are taking.  · Have headaches that keep coming back (recurring).  · Feel dizzy.  · Have swelling in your ankles.  · Have trouble with your vision.  Get help right away if you:  · Get a very bad headache.  · Start to feel mixed up (confused).  · Feel weak or numb.  · Feel faint.  · Have very bad pain in your:  ? Chest.  ? Belly (abdomen).  · Throw up more than once.  · Have trouble breathing.  Summary  · Hypertension is another name for high blood pressure.  · High blood pressure forces your heart to work harder to pump blood.  · For most people, a normal blood pressure is less than 120/80.  · Making healthy choices can help lower blood pressure. If your blood pressure does not get lower with healthy choices, you may need to take medicine.  This information is not intended to replace advice given to you by your health care provider. Make sure you discuss any questions you have with your health care provider.  Document Released: 06/05/2009 Document Revised: 08/28/2019 Document Reviewed: 08/28/2019  [employer] Patient Education © 2020 [employer]      · Is patient discharged on Warfarin / Coumadin?   No     Depression / Suicide Risk    As you are discharged from this [facility] facility, it is important to learn how to keep safe from harming yourself.    Recognize the warning signs:  · Abrupt changes in personality, positive or negative- including increase in energy   · Giving away possessions  · Change in eating patterns- significant weight changes-  positive or negative  · Change in sleeping patterns- unable to sleep or sleeping all the time   · Unwillingness or inability to communicate  · Depression  · Unusual sadness, discouragement and loneliness  · Talk of wanting to die  · Neglect of personal appearance   · Rebelliousness-  reckless behavior  · Withdrawal from people/activities they love  · Confusion- inability to concentrate     If you or a loved one observes any of these behaviors or has concerns about self-harm, here's what you can do:  · Talk about it- your feelings and reasons for harming yourself  · Remove any means that you might use to hurt yourself (examples: pills, rope, extension cords, firearm)  · Get professional help from the community (Mental Health, Substance Abuse, psychological counseling)  · Do not be alone:Call your Safe Contact- someone whom you trust who will be there for you.  · Call your local CRISIS HOTLINE 759-5085 or 489-384-0426  · Call your local Children's Mobile Crisis Response Team [place] (697) 217-6266 or www.[place].st Classifieds  · Call the toll free National Suicide Prevention Hotlines   · National Suicide Prevention Lifeline 889-635-QWPG (2732)  · National Hope Line Network 800-SUICIDE (467-7585)

## 2021-02-09 NOTE — CARE PLAN
Problem: Safety  Goal: Will remain free from injury  Outcome: PROGRESSING AS EXPECTED  Goal: Will remain free from falls  Outcome: PROGRESSING AS EXPECTED     Problem: Infection  Goal: Will remain free from infection  Outcome: PROGRESSING AS EXPECTED     Problem: Fluid Volume:  Goal: Will maintain balanced intake and output  Outcome: PROGRESSING AS EXPECTED

## 2021-02-09 NOTE — FACE TO FACE
Face to Face Supporting Documentation - Home Health    The encounter with this patient was in whole or in part the primary reason for home health admission.    Date of encounter:   Patient:                    MRN:                       YOB: 2021  Kerwin Wheat  [id number]  3/7/1934     Home health to see patient for:  Speech Language Pathology evaluation and treatment    Skilled need for:  Recent Deterioration of Health Status Weakness    Skilled nursing interventions to include:  Comment: Speech therapy    Homebound status evidenced by:  Needs the assistance of another person in order to leave the home. Leaving home requires a considerable and taxing effort. There is a normal inability to leave the home.    Community Physician to provide follow up care: Pcp Not In Computer     Optional Interventions? No      I certify the face to face encounter for this home health care referral meets the CMS requirements and the encounter/clinical assessment with the patient was, in whole, or in part, for the medical condition(s) listed above, which is the primary reason for home health care. Based on my clinical findings: the service(s) are medically necessary, support the need for home health care, and the homebound criteria are met.  I certify that this patient has had a face to face encounter by myself.  Gilberto Osuna M.D. - NPI: [id number]

## 2021-02-09 NOTE — DISCHARGE PLANNING
Anticipated Discharge Disposition: home with outpatient therapy services.    Action: Met with patient to get choice for home health. Patient expressed wishes to do outpatient therapy services.     LSW provided provider information for [facility] located in [place], close to the patients home. Provided name of provider, phone number and address.     Notified MD in IDT rounds.     LSW issued IMM at 12 pm. Patient is in agreement and is excited to be going home. Provided IMM copy to patient. Does not plan to appeal.     Barriers to Discharge: setting up outpatient SLT     Plan: LSW to assist as needed.

## 2021-02-09 NOTE — HOSPITAL COURSE
86 y.o. male who presented 2/3/2021 transfer from [facility] for slurred speech.  She has history of A. 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  This afternoon patient was in normal state of health, when around 2:50 PM he developed acute onset inability to talk.  Son reported that patient went to go to the bathroom and upon his return he was unable to speak.  He was reported to be frustrated that he was unable to get his words out.  At that time, there were no reports of seizure-like activity, confusion, motor weakness, or obvious facial droop.  Son was concerned for possible stroke at which time he was brought to [facility] ED.  While there, CTA head and neck and MRI brain were performed without significant findings.  Chest x-ray was also performed without significant findings.  Ammonia level was less than 10, troponin I less than 0.02, UA negative, B12 level 10,453, TSH 4.31, free T4 1.06, Covid negative.  Etiology of aphasia was unclear.  Patient was given 1 L NS, magnesium, and Tylenol.  Neurology was consulted and recommended patient be transferred to [facility] for further work-up.  Patient currently lying in bed in no acute distress.  Denies any acute complaints at this time.  Does report some improvement in his aphasia.

## 2021-02-09 NOTE — PROGRESS NOTES
D/C called bedside ANTHONY Naidu to ask regarding outpatient speech or  speech. Dieter Naidu pt is discharging with outpatient speech therapy.

## 2021-02-09 NOTE — DISCHARGE SUMMARY
"Discharge Summary    CHIEF COMPLAINT ON ADMISSION  No chief complaint on file.      Reason for Admission  expressive aphasia     Admission Date  2/3/2021    CODE STATUS  Full Code    HPI & HOSPITAL COURSE  Per notes, \"86 y.o. male who presented 2/3/2021 transfer from [facility] for slurred speech.  She has history of A. 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    On the day of admission, around 2:50 PM he developed acute onset inability to talk.  Son reported that patient went to go to the bathroom and upon his return he was unable to speak.  He was reported to be frustrated that he was unable to get his words out.  At that time, there were no reports of seizure-like activity, confusion, motor weakness, or obvious facial droop.  Son was concerned for possible stroke at which time he was brought to [facility] ED.  While there, CTA head and neck and MRI brain were performed without significant findings.  Chest x-ray was also performed without significant findings.  Ammonia level was less than 10, troponin I less than 0.02, UA negative, B12 level 10,453, TSH 4.31, free T4 1.06, Covid negative.  Etiology of aphasia was unclear.  Patient was given 1 L NS, magnesium, and Tylenol.  Neurology was consulted and recommended patient be transferred to [facility] for further work-up.  He was seen and evaluated by speech therapy, recommended FEES. On  2/5 patient underwent FEES that revealed moderate oropharyngeal dysphagia and is at very high risk for aspiration.  Core track was placed.  Patient was also evaluated by neurology who recommended starting Eliquis for atrial fibrillation and attributes secondary stroke to noncompliance with medication.  Patient's symptoms significantly improved.  He was evaluated by speech therapy again on 2/9, they recommended discontinuing core track and to follow-up with outpatient speech " therapy.    Therefore, he is discharged in good and stable condition to home with close outpatient follow-up.    The patient met 2-midnight criteria for an inpatient stay at the time of discharge.    Discharge Date  2/9/2021    FOLLOW UP ITEMS POST DISCHARGE  Follow-up with PCP  Follow-up with speech therapy    DISCHARGE DIAGNOSES  Principal Problem:    Stroke (HCC) POA: Yes  Active Problems:    A-fib (HCC) POA: Yes    Hyperthyroidism POA: Unknown    Essential hypertension POA: Unknown  Resolved Problems:    * No resolved hospital problems. *      FOLLOW UP  Future Appointments   Date Time Provider Department Center   2/16/2021 12:00 PM LAINA Guerrero     No follow-up provider specified.    MEDICATIONS ON DISCHARGE     Medication List      START taking these medications      Instructions   atorvastatin 40 MG Tabs  Commonly known as: LIPITOR   Take 1 Tab by mouth every evening.  Dose: 40 mg     DILTIAZem 60 MG Tabs  Commonly known as: CARDIZEM   Take 1 Tab by mouth 2 Times a Day.  Dose: 60 mg     losartan 50 MG Tabs  Start taking on: February 10, 2021  Commonly known as: COZAAR   Take 1 Tab by mouth every day.  Dose: 50 mg        CONTINUE taking these medications      Instructions   MAGNESIUM 27 PO   Take  by mouth.     ADOLFO-C PO   Take  by mouth.        STOP taking these medications    metoprolol SR 25 MG Tb24  Commonly known as: TOPROL XL        ASK your doctor about these medications      Instructions   ELIQUIS PO   Take 5 mg by mouth 2 Times a Day.  Dose: 5 mg            Allergies  No Known Allergies    DIET  Orders Placed This Encounter   Procedures   • Diet Order Diet: Level 6 - Soft and Bite Sized (Float meds whole in puree please ); Liquid level: Level 2 - Mildly Thick; Tray Modifications (optional): No Straws     Standing Status:   Standing     Number of Occurrences:   1     Order Specific Question:   Diet:     Answer:   Level 6 - Soft and Bite Sized [23]     Comments:   Float meds  whole in puree please      Order Specific Question:   Liquid level     Answer:   Level 2 - Mildly Thick     Order Specific Question:   Tray Modifications (optional)     Answer:   No Straws       ACTIVITY  As tolerated.  Weight bearing as tolerated    CONSULTATIONS  Neurology  Speech therapy    PROCEDURES  None    LABORATORY  Lab Results   Component Value Date    SODIUM 139 02/09/2021    POTASSIUM 4.9 02/09/2021    CHLORIDE 102 02/09/2021    CO2 26 02/09/2021    GLUCOSE 104 (H) 02/09/2021    BUN 25 (H) 02/09/2021    CREATININE 1.39 02/09/2021        Lab Results   Component Value Date    WBC 8.2 02/09/2021    HEMOGLOBIN 15.4 02/09/2021    HEMATOCRIT 46.8 02/09/2021    PLATELETCT 238 02/09/2021        Total time of the discharge process exceeds 31 minutes.

## 2021-02-16 ENCOUNTER — OFFICE VISIT (OUTPATIENT)
Dept: MEDICAL GROUP | Facility: PHYSICIAN GROUP | Age: 86
End: 2021-02-16
Payer: MEDICARE

## 2021-02-16 VITALS
WEIGHT: 150.8 LBS | TEMPERATURE: 99 F | HEIGHT: 72 IN | BODY MASS INDEX: 20.42 KG/M2 | RESPIRATION RATE: 20 BRPM | DIASTOLIC BLOOD PRESSURE: 70 MMHG | HEART RATE: 76 BPM | OXYGEN SATURATION: 99 % | SYSTOLIC BLOOD PRESSURE: 115 MMHG

## 2021-02-16 DIAGNOSIS — I10 ESSENTIAL HYPERTENSION: ICD-10-CM

## 2021-02-16 DIAGNOSIS — I48.91 ATRIAL FIBRILLATION, UNSPECIFIED TYPE (HCC): ICD-10-CM

## 2021-02-16 DIAGNOSIS — R94.4 DECREASED GFR: ICD-10-CM

## 2021-02-16 DIAGNOSIS — Z23 NEED FOR VACCINATION: ICD-10-CM

## 2021-02-16 DIAGNOSIS — T17.908D ASPIRATION INTO AIRWAY, SUBSEQUENT ENCOUNTER: ICD-10-CM

## 2021-02-16 DIAGNOSIS — I63.9 CEREBROVASCULAR ACCIDENT (CVA), UNSPECIFIED MECHANISM (HCC): ICD-10-CM

## 2021-02-16 PROCEDURE — 99204 OFFICE O/P NEW MOD 45 MIN: CPT | Mod: 25 | Performed by: PHYSICIAN ASSISTANT

## 2021-02-16 PROCEDURE — G0009 ADMIN PNEUMOCOCCAL VACCINE: HCPCS | Performed by: PHYSICIAN ASSISTANT

## 2021-02-16 PROCEDURE — 90732 PPSV23 VACC 2 YRS+ SUBQ/IM: CPT | Performed by: PHYSICIAN ASSISTANT

## 2021-02-16 ASSESSMENT — FIBROSIS 4 INDEX: FIB4 SCORE: 1.96

## 2021-02-16 ASSESSMENT — PATIENT HEALTH QUESTIONNAIRE - PHQ9: CLINICAL INTERPRETATION OF PHQ2 SCORE: 0

## 2021-02-16 NOTE — PROGRESS NOTES
CC:    Chief Complaint   Patient presents with   • [facility]     hospital visit       HISTORY OF THE PRESENT ILLNESS: Patient is a 86 y.o. male presenting to [facility] primary care. He recently moved to [place] from [place] last year.     1. Has been eating regular food since discharge. He was having trouble swallowing previously and concern for aspiration. Had a swallowing study done in hospital. Discharged again on eliquis for his Afib. He is no longer coughing when he swallows. Pt initially stopped taking eliquis because it was making him sweat heavily at night and fatigued during the day. He is now tolerating the medication well.   2. Pt did not have consistent cardiology care since moving here because of the pandemic last year.   3. Pt's A1C while in hospital was 5.6%.  4. Pt on lipitor 40mg.   5. Pt's GFR was fluctuant while in hospital. No know CKD.     No problem-specific Assessment & Plan notes found for this encounter.    Allergies: Patient has no known allergies.    Current Outpatient Medications Ordered in Epic   Medication Sig Dispense Refill   • atorvastatin (LIPITOR) 40 MG Tab Take 1 Tab by mouth every evening. 30 Tab 0   • DILTIAZem (CARDIZEM) 60 MG Tab Take 1 Tab by mouth 2 Times a Day. 120 Tab 0   • losartan (COZAAR) 50 MG Tab Take 1 Tab by mouth every day. 30 Tab 0   • Apixaban (ELIQUIS PO) Take 5 mg by mouth 2 Times a Day.     • Magnesium Gluconate (MAGNESIUM 27 PO) Take  by mouth.     • Ascorbic Acid (ADOLFO-C PO) Take  by mouth.       No current Epic-ordered facility-administered medications on file.       Past Medical History:   Diagnosis Date   • 2002        History reviewed. No pertinent surgical history.    Social History     Tobacco Use   • Smoking status: Never Smoker   • Smokeless tobacco: Never Used   Substance Use Topics   • Alcohol use: Not Currently   • Drug use: Never       Social History     Social History Narrative   • Not on file       No family history on file.    ROS:     -  Constitutional: Negative for fever, chills, unexpected weight change, and fatigue/generalized weakness.     - HEENT: Positive for dysphagia. Negative for headaches, vision changes, hearing changes, ear pain, ear discharge, rhinorrhea, sinus congestion, sore throat, and neck pain.      - Respiratory: Negative for cough, sputum production, chest congestion, dyspnea, wheezing, and crackles.      - Cardiovascular: Negative for chest pain, palpitations, orthopnea, and bilateral lower extremity edema.     - Gastrointestinal: Negative for heartburn, nausea, vomiting, abdominal pain, hematochezia, melena, diarrhea, constipation, and greasy/foul-smelling stools.     - Genitourinary: Negative for dysuria, polyuria, hematuria, pyuria, urinary urgency, and urinary incontinence.     - Musculoskeletal: Negative for myalgias, back pain, and joint pain.     - Skin: Negative for rash, itching, cyanotic skin color change.     - Neurological: Negative for dizziness, tingling, tremors, focal sensory deficit, focal weakness and headaches.     - Endo/Heme/Allergies: Does not bruise/bleed easily.     - Psychiatric/Behavioral: Negative for depression, suicidal/homicidal ideation and memory loss.            .      Exam: /70 (BP Location: Right arm, Patient Position: Sitting, BP Cuff Size: Adult)   Pulse 76   Temp 37.2 °C (99 °F) (Temporal)   Resp 20   Ht 1.829 m (6')   Wt 68.4 kg (150 lb 12.8 oz)   SpO2 99%  Body mass index is 20.45 kg/m².    General: Normal appearing. No acute distress.  Skin: Warm and dry.  No obvious lesions.  HEENT: Normocephalic. Eyes conjunctiva clear lids without ptosis, ears normal shape and contour  Cardiovascular:Irregular rate and rhythm without murmur.   Respiratory: Clear to auscultation bilaterally, no rhonchi wheezing or rales.  Neurologic: Grossly nonfocal, A&O x3, gait normal,  Musculoskeletal: No deformity or swelling.   Extremities: No extremity cyanosis, clubbing, or edema.  Psych: Normal  mood and affect. Judgment and insight is normal.    Please note that this dictation was created using voice recognition software. I have made every reasonable attempt to correct obvious errors, but I expect that there are errors of grammar and possibly content that I did not discover before finalizing the note.    LABS: 2/16/2021: Results reviewed and discussed with the patient, questions answered.      Assessment/Plan  1. Atrial fibrillation, unspecified type (HCC)  Currently in afib. Continue with eliquis. Will refer to cardiology for further management  - REFERRAL TO CARDIOLOGY    2. Cerebrovascular accident (CVA), unspecified mechanism (HCC)  Resolved at this point. Likely secondary to stopping his eliquis    3. Essential hypertension  BP currently very well controlled  - REFERRAL TO CARDIOLOGY    4. Need for vaccination    - Pneumococal Polysaccharide Vaccine 23-Valent =>3YO SQ/IM    5. Decreased GFR  Will check this level again and f/u as needed  - Comp Metabolic Panel; Future    6. Aspiration into airway, subsequent encounter  This appears to be improving but he would like to have therapy to prevent future aspiration.   - REFERRAL TO OCCUPATIONAL THERAPY

## 2021-02-16 NOTE — PROGRESS NOTES
Left a message with patient's Emergency Contact, Shaji,  notifying him that there are home medications in our pharmacy and they will be destroyed after 30 days. The patient's voicemail was full, so I couldn't leave a message for him directly.

## 2021-03-01 RX ORDER — LOSARTAN POTASSIUM 50 MG/1
50 TABLET ORAL DAILY
Qty: 30 TABLET | Refills: 0 | Status: SHIPPED | OUTPATIENT
Start: 2021-03-01 | End: 2021-03-19

## 2021-03-01 RX ORDER — ATORVASTATIN CALCIUM 40 MG/1
40 TABLET, FILM COATED ORAL EVERY EVENING
Qty: 30 TABLET | Refills: 0 | Status: SHIPPED | OUTPATIENT
Start: 2021-03-01 | End: 2021-03-19 | Stop reason: SDUPTHER

## 2021-03-04 ENCOUNTER — TELEPHONE (OUTPATIENT)
Dept: MEDICAL GROUP | Facility: PHYSICIAN GROUP | Age: 86
End: 2021-03-04

## 2021-03-04 NOTE — TELEPHONE ENCOUNTER
Pt's son called stating that he father was set up to get the [employer] COVID vaccine. Per pt he was seen a couple of weeks ago and wanted to make sure that he was in good health to receive the vaccine.   Pt's son was advised that we ultimately cannot tell a patient whether or not they should get it, but that it is recommended but the decision to get the vaccine remains up to the patient.

## 2021-03-09 ENCOUNTER — TELEPHONE (OUTPATIENT)
Dept: CARDIOLOGY | Facility: MEDICAL CENTER | Age: 86
End: 2021-03-09

## 2021-03-09 NOTE — TELEPHONE ENCOUNTER
Called new patient in regards to his appointment with  on 03/19/21 @ 1:45pm. Pt stated he has not seen a previous Cardiologist in the past, imaging/blood work is up to date and within chart.Let him know he will have an EKG at his appointment, and to not wear any lotion or the electrodes won't stick. Confirmed appt time and location, pt verbally understood.

## 2021-03-19 ENCOUNTER — OFFICE VISIT (OUTPATIENT)
Dept: CARDIOLOGY | Facility: CLINIC | Age: 86
End: 2021-03-19
Payer: MEDICARE

## 2021-03-19 VITALS
WEIGHT: 158 LBS | SYSTOLIC BLOOD PRESSURE: 142 MMHG | HEART RATE: 63 BPM | HEIGHT: 72 IN | DIASTOLIC BLOOD PRESSURE: 70 MMHG | BODY MASS INDEX: 21.4 KG/M2 | OXYGEN SATURATION: 98 %

## 2021-03-19 DIAGNOSIS — I10 ESSENTIAL (PRIMARY) HYPERTENSION: ICD-10-CM

## 2021-03-19 DIAGNOSIS — Z79.01 CHRONIC ANTICOAGULATION: Chronic | ICD-10-CM

## 2021-03-19 DIAGNOSIS — I48.19 PERSISTENT ATRIAL FIBRILLATION (HCC): ICD-10-CM

## 2021-03-19 DIAGNOSIS — Z86.73 H/O: CVA (CEREBROVASCULAR ACCIDENT): ICD-10-CM

## 2021-03-19 PROCEDURE — 99204 OFFICE O/P NEW MOD 45 MIN: CPT | Performed by: INTERNAL MEDICINE

## 2021-03-19 RX ORDER — METOPROLOL SUCCINATE 50 MG/1
50 TABLET, EXTENDED RELEASE ORAL
COMMUNITY
Start: 2020-09-24 | End: 2021-03-19

## 2021-03-19 RX ORDER — LOSARTAN POTASSIUM 50 MG/1
50 TABLET ORAL DAILY
Qty: 90 TABLET | Refills: 3 | Status: SHIPPED | OUTPATIENT
Start: 2021-03-19 | End: 2022-05-13

## 2021-03-19 RX ORDER — MULTIVITAMIN
1 CAPSULE ORAL DAILY
COMMUNITY

## 2021-03-19 RX ORDER — ATORVASTATIN CALCIUM 40 MG/1
40 TABLET, FILM COATED ORAL EVERY EVENING
Qty: 90 TABLET | Refills: 3 | Status: SHIPPED | OUTPATIENT
Start: 2021-03-19 | End: 2022-06-09

## 2021-03-19 RX ORDER — DILTIAZEM HYDROCHLORIDE 120 MG/1
120 CAPSULE, COATED, EXTENDED RELEASE ORAL DAILY
Qty: 90 CAPSULE | Refills: 3 | Status: SHIPPED | OUTPATIENT
Start: 2021-03-19 | End: 2022-10-20 | Stop reason: SDUPTHER

## 2021-03-19 ASSESSMENT — ENCOUNTER SYMPTOMS
ABDOMINAL PAIN: 0
PALPITATIONS: 0
PND: 0
SORE THROAT: 0
COUGH: 0
FEVER: 0
SHORTNESS OF BREATH: 0
NAUSEA: 0
FALLS: 0
WEAKNESS: 0
CLAUDICATION: 0
DIZZINESS: 0
FOCAL WEAKNESS: 0
CHILLS: 0
BLURRED VISION: 1
BRUISES/BLEEDS EASILY: 0

## 2021-03-19 ASSESSMENT — FIBROSIS 4 INDEX: FIB4 SCORE: 1.98

## 2021-03-19 NOTE — PROGRESS NOTES
Chief Complaint   Patient presents with   • Atrial Fibrillation       Subjective:   Kerwin Wheat is a 87 y.o. male who presents today in consultation from Steven Coyne for evaluation of his history of A. fib with recent history for stroke Previously he was given cardiology care [place] but moved to [place].  He had been off his Eliquis and suffered a stroke was hospitalized at [facility] his heart testing showed permanent atrial fibrillation started back on anticoagulation and has been doing well recovered from stroke    Past Medical History:   Diagnosis Date   • 2002    • Arrhythmia    • Chronic anticoagulation    • H/O: CVA (cerebrovascular accident) - had CVA off Eliquis 12/2020      History reviewed. No pertinent surgical history.  History reviewed. No pertinent family history.  Social History     Socioeconomic History   • Marital status: Single     Spouse name: Not on file   • Number of children: Not on file   • Years of education: Not on file   • Highest education level: Not on file   Occupational History   • Not on file   Tobacco Use   • Smoking status: Never Smoker   • Smokeless tobacco: Never Used   Substance and Sexual Activity   • Alcohol use: Not Currently   • Drug use: Never   • Sexual activity: Not on file   Other Topics Concern   • Not on file   Social History Narrative   • Not on file     Social Determinants of Health     Financial Resource Strain:    • Difficulty of Paying Living Expenses:    Food Insecurity:    • Worried About Running Out of Food in the Last Year:    • Ran Out of Food in the Last Year:    Transportation Needs: No Transportation Needs   • Lack of Transportation (Medical): No   • Lack of Transportation (Non-Medical): No   Physical Activity:    • Days of Exercise per Week:    • Minutes of Exercise per Session:    Stress:    • Feeling of Stress :    Social Connections:    • Frequency of Communication with Friends and Family:    • Frequency of Social Gatherings with Friends  and Family:    • Attends Jainism Services:    • Active Member of Clubs or Organizations:    • Attends Club or Organization Meetings:    • Marital Status:    Intimate Partner Violence:    • Fear of Current or Ex-Partner:    • Emotionally Abused:    • Physically Abused:    • Sexually Abused:      No Known Allergies  Outpatient Encounter Medications as of 3/19/2021   Medication Sig Dispense Refill   • Multiple Vitamin (MULTIVITAMIN) capsule Take 1 capsule by mouth every day.     • vitamin D (VITAMIND D3) 1000 UNIT Tab Take 1 tablet by mouth every day.     • apixaban (ELIQUIS) 5mg Tab Take 1 tablet by mouth 2 Times a Day. 180 tablet 3   • atorvastatin (LIPITOR) 40 MG Tab Take 1 tablet by mouth every evening. 90 tablet 3   • losartan (COZAAR) 50 MG Tab Take 1 tablet by mouth every day. 90 tablet 3   • DILTIAZem CD (CARDIZEM CD) 120 MG CAPSULE SR 24 HR Take 1 capsule by mouth every day. 90 capsule 3   • Magnesium Gluconate (MAGNESIUM 27 PO) Take  by mouth.     • Ascorbic Acid (ADOLFO-C PO) Take  by mouth.     • [DISCONTINUED] metoprolol SR (TOPROL XL) 50 MG TABLET SR 24 HR Take 50 mg by mouth.     • [DISCONTINUED] losartan (COZAAR) 50 MG Tab Take 1 tablet by mouth every day. 30 tablet 0   • [DISCONTINUED] atorvastatin (LIPITOR) 40 MG Tab Take 1 tablet by mouth every evening. 30 tablet 0   • [DISCONTINUED] apixaban (ELIQUIS) 5mg Tab Take 1 tablet by mouth 2 Times a Day. 60 tablet 0   • [DISCONTINUED] DILTIAZem (CARDIZEM) 60 MG Tab Take 1 Tab by mouth 2 Times a Day. 120 Tab 0     No facility-administered encounter medications on file as of 3/19/2021.     Review of Systems   Constitutional: Negative for chills and fever.   HENT: Negative for sore throat.    Eyes: Positive for blurred vision.   Respiratory: Negative for cough and shortness of breath.    Cardiovascular: Negative for chest pain, palpitations, claudication, leg swelling and PND.   Gastrointestinal: Negative for abdominal pain and nausea.   Musculoskeletal:  Negative for falls and joint pain.   Skin: Positive for rash.   Neurological: Negative for dizziness, focal weakness and weakness.   Endo/Heme/Allergies: Positive for environmental allergies. Does not bruise/bleed easily.        Objective:   /70 (BP Location: Left arm, Patient Position: Sitting, BP Cuff Size: Adult)   Pulse 63   Ht 1.829 m (6')   Wt 71.7 kg (158 lb)   SpO2 98%   BMI 21.43 kg/m²     Physical Exam   Constitutional: No distress.   HENT:   Patient wearing a mask due to COVID precautions   Eyes: No scleral icterus.   Neck: No JVD present.   Cardiovascular: Normal rate and normal heart sounds. An irregularly irregular rhythm present. Exam reveals no gallop and no friction rub.   No murmur heard.  Pulmonary/Chest: No respiratory distress. He has no wheezes. He has no rales.   Abdominal: Soft. Bowel sounds are normal.   Musculoskeletal:         General: No edema.   Neurological: He is alert.   Skin: No rash noted. He is not diaphoretic.   Psychiatric: He has a normal mood and affect.     We reviewed in person the most recent labs  Recent Results (from the past 4032 hour(s))   CBC WITH DIFFERENTIAL    Collection Time: 02/03/21  3:05 PM   Result Value Ref Range    WBC 9.1 4.8 - 10.8 K/uL    RBC 5.42 4.70 - 6.10 M/uL    Hemoglobin 15.2 14.0 - 18.0 g/dL    Hematocrit 45.4 42.0 - 52.0 %    MCV 83.8 80.0 - 94.0 fL    MCH 28.0 27.0 - 31.0 pg    MCHC 33.5 33.0 - 37.0 g/dL    RDW 14.7 (H) 11.5 - 14.5 %    Platelet Count 254 130 - 400 K/uL    MPV 10.8 (H) 7.4 - 10.4 fL    Neutrophils Automated 63.3 39.0 - 70.0 %    Lymphocytes Automated 27.0 21.0 - 50.0 %    Monocytes Automated 7.2 2.0 - 9.0 %    Eosinophils Automated 1.3 0.0 - 5.0 %    Basophils Automated 0.9 0.0 - 3.0 %    Abs Neutrophils Automated 5.8 1.8 - 7.7 K/uL    Abs Lymph Automated 2.5 1.2 - 4.8 K/uL    Eosinophil Count, Blood 0.12 0.00 - 0.50 K/uL   COMP METABOLIC PANEL    Collection Time: 02/03/21  3:05 PM   Result Value Ref Range    Sodium 142  136 - 145 mmol/L    Potassium 3.3 (L) 3.5 - 5.1 mmol/L    Chloride 102 98 - 107 mmol/L    Co2 27 21 - 32 mmol/L    Anion Gap 16 10 - 18 mmol/L    Glucose 88 74 - 99 mg/dL    Bun 23 (H) 7 - 18 mg/dL    Creatinine 1.5 (H) 0.8 - 1.3 mg/dL    Calcium 9.0 8.5 - 11.0 mg/dL    AST(SGOT) 26 15 - 37 U/L    ALT(SGPT) 25 12 - 78 U/L    Alkaline Phosphatase 106 46 - 116 U/L    Total Bilirubin 0.8 0.2 - 1.0 mg/dL    Albumin 3.9 3.4 - 5.0 g/dL    Total Protein 8.4 (H) 6.4 - 8.2 g/dL    A-G Ratio 0.9    PROTHROMBIN TIME    Collection Time: 02/03/21  3:05 PM   Result Value Ref Range    PT 10.8 9.3 - 11.7 sec    INR 1.02    TROPONIN    Collection Time: 02/03/21  3:05 PM   Result Value Ref Range    Troponin I <0.02 0.00 - 0.06 ng/mL   ESTIMATED GFR    Collection Time: 02/03/21  3:05 PM   Result Value Ref Range    GFR If  54 (A) >60 mL/min/1.73 m 2    GFR If Non  44 (A) >60 mL/min/1.73 m 2   TSH    Collection Time: 02/03/21  3:05 PM   Result Value Ref Range    TSH 4.31 (H) 0.36 - 3.74 uIU/mL   VITAMIN B12    Collection Time: 02/03/21  3:05 PM   Result Value Ref Range    Vitamin B12 -True Cobalamin 88904 (H) 193 - 986 pg/mL   FREE THYROXINE    Collection Time: 02/03/21  3:05 PM   Result Value Ref Range    Free T-4 1.06 0.76 - 1.46 ng/dL   COVID/SARS CoV-2 PCR    Collection Time: 02/03/21  3:30 PM    Specimen: Nasopharyngeal; Respirate   Result Value Ref Range    COVID Order Status Received    SARS-COV Antigen LADI    Collection Time: 02/03/21  3:30 PM   Result Value Ref Range    SARS-CoV-2 Source NP Swab     SARS-COV ANTIGEN LADI NotDetected Not-Detected   URINALYSIS,CULTURE IF INDICATED    Collection Time: 02/03/21  5:25 PM    Specimen: Urine   Result Value Ref Range    Color YELLOW     Character CLEAR     Specific Gravity 1.025 1.003 - 1.030    Ph 5.5 5.0 - 8.0    Glucose NEGATIVE Negative mg/dL    Ketones NEGATIVE Negative mg/dL    Protein NEGATIVE Negative mg/dL    Bilirubin NEGATIVE Negative     Urobilinogen, Urine 0.2 0.2 - 1.0 mg/dL    Nitrite NEGATIVE Negative    Leukocyte Esterase NEGATIVE Negative    Occult Blood NEGATIVE Negative    Culture Indicated No UA Culture   URINE DRUG SCREEN    Collection Time: 02/03/21  5:25 PM   Result Value Ref Range    Phencyclidine -Pcp Negative Negative    Benzodiazepines Negative Negative    Cocaine Metabolite Negative Negative    Amphetamines By Triage Negative Negative    Urine THC Negative Negative    Opiates Negative Negative    Barbiturates Negative Negative    Methadone Negative Negative    Urine Amphetamine-Methamphetam Negative Negative    Tricyclic Antidepressants Negative Negative   AMMONIA    Collection Time: 02/03/21  5:30 PM   Result Value Ref Range    Ammonia <10 (L) 11 - 32 umol/L   CBC with Differential    Collection Time: 02/04/21  6:46 AM   Result Value Ref Range    WBC 7.0 4.8 - 10.8 K/uL    RBC 5.11 4.70 - 6.10 M/uL    Hemoglobin 14.3 14.0 - 18.0 g/dL    Hematocrit 43.1 42.0 - 52.0 %    MCV 84.3 81.4 - 97.8 fL    MCH 28.0 27.0 - 33.0 pg    MCHC 33.2 (L) 33.7 - 35.3 g/dL    RDW 45.3 35.9 - 50.0 fL    Platelet Count 220 164 - 446 K/uL    MPV 12.6 9.0 - 12.9 fL    Neutrophils-Polys 66.70 44.00 - 72.00 %    Lymphocytes 22.40 22.00 - 41.00 %    Monocytes 7.80 0.00 - 13.40 %    Eosinophils 1.60 0.00 - 6.90 %    Basophils 1.10 0.00 - 1.80 %    Immature Granulocytes 0.40 0.00 - 0.90 %    Nucleated RBC 0.00 /100 WBC    Neutrophils (Absolute) 4.64 1.82 - 7.42 K/uL    Lymphs (Absolute) 1.56 1.00 - 4.80 K/uL    Monos (Absolute) 0.54 0.00 - 0.85 K/uL    Eos (Absolute) 0.11 0.00 - 0.51 K/uL    Baso (Absolute) 0.08 0.00 - 0.12 K/uL    Immature Granulocytes (abs) 0.03 0.00 - 0.11 K/uL    NRBC (Absolute) 0.00 K/uL   Comp Metabolic Panel (CMP)    Collection Time: 02/04/21  6:46 AM   Result Value Ref Range    Sodium 138 135 - 145 mmol/L    Potassium 3.7 3.6 - 5.5 mmol/L    Chloride 105 96 - 112 mmol/L    Co2 22 20 - 33 mmol/L    Anion Gap 11.0 7.0 - 16.0    Glucose 99  65 - 99 mg/dL    Bun 16 8 - 22 mg/dL    Creatinine 1.12 0.50 - 1.40 mg/dL    Calcium 9.1 8.5 - 10.5 mg/dL    AST(SGOT) 21 12 - 45 U/L    ALT(SGPT) 15 2 - 50 U/L    Alkaline Phosphatase 80 30 - 99 U/L    Total Bilirubin 1.0 0.1 - 1.5 mg/dL    Albumin 3.7 3.2 - 4.9 g/dL    Total Protein 6.3 6.0 - 8.2 g/dL    Globulin 2.6 1.9 - 3.5 g/dL    A-G Ratio 1.4 g/dL   Lipid Profile (Lipid Panel) Fasting    Collection Time: 02/04/21  6:46 AM   Result Value Ref Range    Cholesterol,Tot 182 100 - 199 mg/dL    Triglycerides 85 0 - 149 mg/dL    HDL 48 >=40 mg/dL     (H) <100 mg/dL   HEMOGLOBIN A1C    Collection Time: 02/04/21  6:46 AM   Result Value Ref Range    Glycohemoglobin 5.6 0.0 - 5.6 %    Est Avg Glucose 114 mg/dL   ESTIMATED GFR    Collection Time: 02/04/21  6:46 AM   Result Value Ref Range    GFR If African American >60 >60 mL/min/1.73 m 2    GFR If Non African American >60 >60 mL/min/1.73 m 2   CBC WITH DIFFERENTIAL    Collection Time: 02/05/21  7:19 AM   Result Value Ref Range    WBC 6.0 4.8 - 10.8 K/uL    RBC 4.89 4.70 - 6.10 M/uL    Hemoglobin 13.7 (L) 14.0 - 18.0 g/dL    Hematocrit 42.0 42.0 - 52.0 %    MCV 85.9 81.4 - 97.8 fL    MCH 28.0 27.0 - 33.0 pg    MCHC 32.6 (L) 33.7 - 35.3 g/dL    RDW 46.7 35.9 - 50.0 fL    Platelet Count 208 164 - 446 K/uL    MPV 12.1 9.0 - 12.9 fL    Neutrophils-Polys 55.10 44.00 - 72.00 %    Lymphocytes 31.80 22.00 - 41.00 %    Monocytes 8.50 0.00 - 13.40 %    Eosinophils 3.20 0.00 - 6.90 %    Basophils 1.20 0.00 - 1.80 %    Immature Granulocytes 0.20 0.00 - 0.90 %    Nucleated RBC 0.00 /100 WBC    Neutrophils (Absolute) 3.29 1.82 - 7.42 K/uL    Lymphs (Absolute) 1.90 1.00 - 4.80 K/uL    Monos (Absolute) 0.51 0.00 - 0.85 K/uL    Eos (Absolute) 0.19 0.00 - 0.51 K/uL    Baso (Absolute) 0.07 0.00 - 0.12 K/uL    Immature Granulocytes (abs) 0.01 0.00 - 0.11 K/uL    NRBC (Absolute) 0.00 K/uL   Basic Metabolic Panel    Collection Time: 02/05/21  7:19 AM   Result Value Ref Range    Sodium  140 135 - 145 mmol/L    Potassium 3.8 3.6 - 5.5 mmol/L    Chloride 107 96 - 112 mmol/L    Co2 24 20 - 33 mmol/L    Glucose 95 65 - 99 mg/dL    Bun 15 8 - 22 mg/dL    Creatinine 1.32 0.50 - 1.40 mg/dL    Calcium 8.8 8.5 - 10.5 mg/dL    Anion Gap 9.0 7.0 - 16.0   ESTIMATED GFR    Collection Time: 02/05/21  7:19 AM   Result Value Ref Range    GFR If African American >60 >60 mL/min/1.73 m 2    GFR If Non  51 (A) >60 mL/min/1.73 m 2   EC-ECHOCARDIOGRAM COMPLETE W/O CONT    Collection Time: 02/05/21 11:27 PM   Result Value Ref Range    Eject.Frac. MOD BP 43.49     Eject.Frac. MOD 4C 46.89     Eject.Frac. MOD 2C 41.27     Left Ventrical Ejection Fraction 55    CRP Quantitive (Non-Cardiac)    Collection Time: 02/06/21  7:53 AM   Result Value Ref Range    Stat C-Reactive Protein 0.14 0.00 - 0.75 mg/dL   Prealbumin    Collection Time: 02/06/21  7:53 AM   Result Value Ref Range    Pre-Albumin 22.0 18.0 - 38.0 mg/dL   CBC WITH DIFFERENTIAL    Collection Time: 02/06/21  7:53 AM   Result Value Ref Range    WBC 7.0 4.8 - 10.8 K/uL    RBC 5.46 4.70 - 6.10 M/uL    Hemoglobin 15.4 14.0 - 18.0 g/dL    Hematocrit 45.6 42.0 - 52.0 %    MCV 83.5 81.4 - 97.8 fL    MCH 28.2 27.0 - 33.0 pg    MCHC 33.8 33.7 - 35.3 g/dL    RDW 44.4 35.9 - 50.0 fL    Platelet Count 236 164 - 446 K/uL    MPV 12.3 9.0 - 12.9 fL    Neutrophils-Polys 69.90 44.00 - 72.00 %    Lymphocytes 19.00 (L) 22.00 - 41.00 %    Monocytes 7.20 0.00 - 13.40 %    Eosinophils 2.30 0.00 - 6.90 %    Basophils 1.30 0.00 - 1.80 %    Immature Granulocytes 0.30 0.00 - 0.90 %    Nucleated RBC 0.00 /100 WBC    Neutrophils (Absolute) 4.92 1.82 - 7.42 K/uL    Lymphs (Absolute) 1.34 1.00 - 4.80 K/uL    Monos (Absolute) 0.51 0.00 - 0.85 K/uL    Eos (Absolute) 0.16 0.00 - 0.51 K/uL    Baso (Absolute) 0.09 0.00 - 0.12 K/uL    Immature Granulocytes (abs) 0.02 0.00 - 0.11 K/uL    NRBC (Absolute) 0.00 K/uL   Basic Metabolic Panel    Collection Time: 02/06/21  7:53 AM   Result  Value Ref Range    Sodium 139 135 - 145 mmol/L    Potassium 3.7 3.6 - 5.5 mmol/L    Chloride 103 96 - 112 mmol/L    Co2 24 20 - 33 mmol/L    Glucose 88 65 - 99 mg/dL    Bun 15 8 - 22 mg/dL    Creatinine 1.21 0.50 - 1.40 mg/dL    Calcium 9.6 8.5 - 10.5 mg/dL    Anion Gap 12.0 7.0 - 16.0   ESTIMATED GFR    Collection Time: 02/06/21  7:53 AM   Result Value Ref Range    GFR If African American >60 >60 mL/min/1.73 m 2    GFR If Non  57 (A) >60 mL/min/1.73 m 2   CRP Quantitive (Non-Cardiac)    Collection Time: 02/08/21 12:07 PM   Result Value Ref Range    Stat C-Reactive Protein 0.20 0.00 - 0.75 mg/dL   Prealbumin    Collection Time: 02/08/21 12:07 PM   Result Value Ref Range    Pre-Albumin 18.1 18.0 - 38.0 mg/dL   CBC WITH DIFFERENTIAL    Collection Time: 02/09/21  3:56 AM   Result Value Ref Range    WBC 8.2 4.8 - 10.8 K/uL    RBC 5.51 4.70 - 6.10 M/uL    Hemoglobin 15.4 14.0 - 18.0 g/dL    Hematocrit 46.8 42.0 - 52.0 %    MCV 84.9 81.4 - 97.8 fL    MCH 27.9 27.0 - 33.0 pg    MCHC 32.9 (L) 33.7 - 35.3 g/dL    RDW 45.3 35.9 - 50.0 fL    Platelet Count 238 164 - 446 K/uL    MPV 12.2 9.0 - 12.9 fL    Neutrophils-Polys 61.90 44.00 - 72.00 %    Lymphocytes 24.10 22.00 - 41.00 %    Monocytes 9.30 0.00 - 13.40 %    Eosinophils 3.50 0.00 - 6.90 %    Basophils 0.70 0.00 - 1.80 %    Immature Granulocytes 0.50 0.00 - 0.90 %    Nucleated RBC 0.00 /100 WBC    Neutrophils (Absolute) 5.08 1.82 - 7.42 K/uL    Lymphs (Absolute) 1.98 1.00 - 4.80 K/uL    Monos (Absolute) 0.76 0.00 - 0.85 K/uL    Eos (Absolute) 0.29 0.00 - 0.51 K/uL    Baso (Absolute) 0.06 0.00 - 0.12 K/uL    Immature Granulocytes (abs) 0.04 0.00 - 0.11 K/uL    NRBC (Absolute) 0.00 K/uL   Basic Metabolic Panel    Collection Time: 02/09/21  3:56 AM   Result Value Ref Range    Sodium 139 135 - 145 mmol/L    Potassium 4.9 3.6 - 5.5 mmol/L    Chloride 102 96 - 112 mmol/L    Co2 26 20 - 33 mmol/L    Glucose 104 (H) 65 - 99 mg/dL    Bun 25 (H) 8 - 22 mg/dL     Creatinine 1.39 0.50 - 1.40 mg/dL    Calcium 9.4 8.5 - 10.5 mg/dL    Anion Gap 11.0 7.0 - 16.0   ESTIMATED GFR    Collection Time: 02/09/21  3:56 AM   Result Value Ref Range    GFR If  58 (A) >60 mL/min/1.73 m 2    GFR If Non  48 (A) >60 mL/min/1.73 m 2   We reviewed the images of his echocardiogram normal ejection fraction no significant valve disease      Assessment:     1. Persistent atrial fibrillation (HCC)     2. H/O: CVA (cerebrovascular accident)     3. Essential (primary) hypertension     4. Chronic anticoagulation         Medical Decision Making:  Today's Assessment / Status / Plan:     It was my pleasure to meet with Mr. Wheat.    Blood pressure is well controlled.  We specifically assessed the labs on hypertension treatment    He understands the risks and benefits of chronic anticoagulation.  We reviewed and verified the results of annual testing for anemia and kidney function.  He is on appropriate statin.    I will see Mr. Wheat back in 1 year time and encouraged him to follow up with us over the phone or electronically using my MyChart as issues arise.    It is my pleasure to participate in the care of Mr. Wheat.  Please do not hesitate to contact me with questions or concerns.    Olayinka Chaudhry MD PhD [facility]  Cardiologist [facility]    Please note that this dictation was created using voice recognition software. There may be errors I did not discover before finalizing the note.

## 2021-06-16 ENCOUNTER — APPOINTMENT (OUTPATIENT)
Dept: MEDICAL GROUP | Facility: PHYSICIAN GROUP | Age: 86
End: 2021-06-16
Payer: MEDICARE

## 2021-07-28 ENCOUNTER — APPOINTMENT (OUTPATIENT)
Dept: MEDICAL GROUP | Facility: PHYSICIAN GROUP | Age: 86
End: 2021-07-28
Payer: MEDICARE

## 2021-08-09 ENCOUNTER — APPOINTMENT (OUTPATIENT)
Dept: MEDICAL GROUP | Facility: PHYSICIAN GROUP | Age: 86
End: 2021-08-09
Payer: MEDICARE

## 2021-08-13 ENCOUNTER — OFFICE VISIT (OUTPATIENT)
Dept: MEDICAL GROUP | Facility: PHYSICIAN GROUP | Age: 86
End: 2021-08-13
Payer: MEDICARE

## 2021-08-13 VITALS
BODY MASS INDEX: 20.72 KG/M2 | WEIGHT: 153 LBS | HEART RATE: 107 BPM | SYSTOLIC BLOOD PRESSURE: 124 MMHG | HEIGHT: 72 IN | OXYGEN SATURATION: 94 % | RESPIRATION RATE: 12 BRPM | TEMPERATURE: 97.8 F | DIASTOLIC BLOOD PRESSURE: 82 MMHG

## 2021-08-13 DIAGNOSIS — I10 ESSENTIAL HYPERTENSION: ICD-10-CM

## 2021-08-13 DIAGNOSIS — I48.19 PERSISTENT ATRIAL FIBRILLATION (HCC): ICD-10-CM

## 2021-08-13 DIAGNOSIS — R61 NIGHT SWEATS: ICD-10-CM

## 2021-08-13 PROCEDURE — 99213 OFFICE O/P EST LOW 20 MIN: CPT | Performed by: PHYSICIAN ASSISTANT

## 2021-08-13 RX ORDER — METOPROLOL SUCCINATE 25 MG/1
25 TABLET, EXTENDED RELEASE ORAL DAILY
Qty: 30 TABLET | Refills: 0 | Status: SHIPPED | OUTPATIENT
Start: 2021-08-13 | End: 2021-09-20

## 2021-08-13 ASSESSMENT — FIBROSIS 4 INDEX: FIB4 SCORE: 1.98

## 2021-08-13 NOTE — PROGRESS NOTES
CC:  Chief Complaint   Patient presents with   • Excessive Sweating     unknown medication       HISTORY OF PRESENT ILLNESS: Patient is a 87 y.o. male established patient presenting for recheck. He did not have f/u after his last appt. He has been having excessive drenching night sweats for past 1.5 years. No weight loss, fevers, chills. He thinks it is 2/2 medications.        No problem-specific Assessment & Plan notes found for this encounter.      Patient Active Problem List    Diagnosis Date Noted   • H/O: CVA (cerebrovascular accident) - had CVA off Eliquis 12/2020    • Chronic anticoagulation    • Hyperthyroidism 02/04/2021   • Stroke (HCC) 02/03/2021   • Persistent atrial fibrillation (HCC) 09/24/2020   • Essential (primary) hypertension 09/24/2018      Allergies:Patient has no known allergies.    Current Outpatient Medications   Medication Sig Dispense Refill   • Multiple Vitamin (MULTIVITAMIN) capsule Take 1 capsule by mouth every day.     • vitamin D (VITAMIND D3) 1000 UNIT Tab Take 1 tablet by mouth every day.     • apixaban (ELIQUIS) 5mg Tab Take 1 tablet by mouth 2 Times a Day. 180 tablet 3   • atorvastatin (LIPITOR) 40 MG Tab Take 1 tablet by mouth every evening. 90 tablet 3   • losartan (COZAAR) 50 MG Tab Take 1 tablet by mouth every day. 90 tablet 3   • DILTIAZem CD (CARDIZEM CD) 120 MG CAPSULE SR 24 HR Take 1 capsule by mouth every day. 90 capsule 3   • Magnesium Gluconate (MAGNESIUM 27 PO) Take  by mouth.     • Ascorbic Acid (ADOLFO-C PO) Take  by mouth.       No current facility-administered medications for this visit.       Social History     Tobacco Use   • Smoking status: Never Smoker   • Smokeless tobacco: Never Used   Vaping Use   • Vaping Use: Never used   Substance Use Topics   • Alcohol use: Not Currently   • Drug use: Never     Social History     Social History Narrative   • Not on file       No family history on file.     ROS:     - Constitutional: Positive for night sweats.  Negative for  fever, chills, unexpected weight change, and fatigue/generalized weakness.    - HEENT:  Negative for headaches, vision changes, hearing changes, ear pain, ear discharge, rhinorrhea, sinus congestion, sore throat, and neck pain.      - Respiratory: Negative for cough, sputum production, chest congestion, dyspnea, wheezing, and crackles.      - Cardiovascular: Negative for chest pain, palpitations, orthopnea, and bilateral lower extremity edema.      - Neurological: Negative for dizziness, tingling, tremors, focal sensory deficit, focal weakness and headaches.      Exam:    /82   Pulse (!) 107   Temp 36.6 °C (97.8 °F) (Temporal)   Resp 12   Ht 1.829 m (6')   Wt 69.4 kg (153 lb)   SpO2 94%  Body mass index is 20.75 kg/m².    General:  Well nourished, well developed male in NAD  Head is grossly normal.  Neck: Supple. Thyroid is not enlarged.  Pulmonary: Clear to auscultation. No ronchi, wheezing or rales  Cardiac: Irregular rate and rhythm. No murmurs.  Skin: Warm and dry. No obvious lesions  Neuro: Normal muscle tone. Gait normal. Alert and oriented.  Psych: Normal mood and affect      Please note that this dictation was created using voice recognition software. I have made every reasonable attempt to correct obvious errors, but I expect that there are errors of grammar and possibly content that I did not discover before finalizing the note.      Assessment/Plan:    1. Persistent atrial fibrillation (HCC)  Continue with eliquis    2. Night sweats  He is very insistent that his medications are causing his sweats. I instructed him to have labs done. Fine to try stopping diltiazem to see if sweats resolve. I will see him again for this in 2 weeks.   - CBC WITH DIFFERENTIAL; Future  - Comp Metabolic Panel; Future  - TSH WITH REFLEX TO FT4; Future    3. Essential hypertension  Will start on metoprolol in place of diltiazem.   - metoprolol SR (TOPROL XL) 25 MG TABLET SR 24 HR; Take 1 Tablet by mouth every day for  30 days.  Dispense: 30 Tablet; Refill: 0

## 2021-09-14 ENCOUNTER — HOSPITAL ENCOUNTER (OUTPATIENT)
Dept: LAB | Facility: MEDICAL CENTER | Age: 86
End: 2021-09-14
Attending: PHYSICIAN ASSISTANT
Payer: MEDICARE

## 2021-09-14 DIAGNOSIS — R94.4 DECREASED GFR: ICD-10-CM

## 2021-09-14 DIAGNOSIS — R61 NIGHT SWEATS: ICD-10-CM

## 2021-09-14 LAB
ALBUMIN SERPL BCP-MCNC: 3.9 G/DL (ref 3.2–4.9)
ALBUMIN/GLOB SERPL: 1.2 G/DL
ALP SERPL-CCNC: 93 U/L (ref 30–99)
ALT SERPL-CCNC: 14 U/L (ref 2–50)
ANION GAP SERPL CALC-SCNC: 10 MMOL/L (ref 7–16)
AST SERPL-CCNC: 18 U/L (ref 12–45)
BASOPHILS # BLD AUTO: 0.6 % (ref 0–1.8)
BASOPHILS # BLD: 0.06 K/UL (ref 0–0.12)
BILIRUB SERPL-MCNC: 0.5 MG/DL (ref 0.1–1.5)
BUN SERPL-MCNC: 32 MG/DL (ref 8–22)
CALCIUM SERPL-MCNC: 9.8 MG/DL (ref 8.5–10.5)
CHLORIDE SERPL-SCNC: 107 MMOL/L (ref 96–112)
CO2 SERPL-SCNC: 25 MMOL/L (ref 20–33)
CREAT SERPL-MCNC: 1.44 MG/DL (ref 0.5–1.4)
EOSINOPHIL # BLD AUTO: 0.09 K/UL (ref 0–0.51)
EOSINOPHIL NFR BLD: 0.9 % (ref 0–6.9)
ERYTHROCYTE [DISTWIDTH] IN BLOOD BY AUTOMATED COUNT: 46.6 FL (ref 35.9–50)
FASTING STATUS PATIENT QL REPORTED: NORMAL
GLOBULIN SER CALC-MCNC: 3.2 G/DL (ref 1.9–3.5)
GLUCOSE SERPL-MCNC: 77 MG/DL (ref 65–99)
HCT VFR BLD AUTO: 48.1 % (ref 42–52)
HGB BLD-MCNC: 15.6 G/DL (ref 14–18)
IMM GRANULOCYTES # BLD AUTO: 0.03 K/UL (ref 0–0.11)
IMM GRANULOCYTES NFR BLD AUTO: 0.3 % (ref 0–0.9)
LYMPHOCYTES # BLD AUTO: 2 K/UL (ref 1–4.8)
LYMPHOCYTES NFR BLD: 20.7 % (ref 22–41)
MCH RBC QN AUTO: 28.8 PG (ref 27–33)
MCHC RBC AUTO-ENTMCNC: 32.4 G/DL (ref 33.7–35.3)
MCV RBC AUTO: 88.9 FL (ref 81.4–97.8)
MONOCYTES # BLD AUTO: 0.73 K/UL (ref 0–0.85)
MONOCYTES NFR BLD AUTO: 7.5 % (ref 0–13.4)
NEUTROPHILS # BLD AUTO: 6.76 K/UL (ref 1.82–7.42)
NEUTROPHILS NFR BLD: 70 % (ref 44–72)
NRBC # BLD AUTO: 0 K/UL
NRBC BLD-RTO: 0 /100 WBC
PLATELET # BLD AUTO: 190 K/UL (ref 164–446)
POTASSIUM SERPL-SCNC: 4.6 MMOL/L (ref 3.6–5.5)
PROT SERPL-MCNC: 7.1 G/DL (ref 6–8.2)
RBC # BLD AUTO: 5.41 M/UL (ref 4.7–6.1)
SODIUM SERPL-SCNC: 142 MMOL/L (ref 135–145)
TSH SERPL DL<=0.005 MIU/L-ACNC: 2.67 UIU/ML (ref 0.38–5.33)
WBC # BLD AUTO: 9.7 K/UL (ref 4.8–10.8)

## 2021-09-14 PROCEDURE — 36415 COLL VENOUS BLD VENIPUNCTURE: CPT

## 2021-09-14 PROCEDURE — 85025 COMPLETE CBC W/AUTO DIFF WBC: CPT

## 2021-09-14 PROCEDURE — 80053 COMPREHEN METABOLIC PANEL: CPT

## 2021-09-14 PROCEDURE — 84443 ASSAY THYROID STIM HORMONE: CPT

## 2021-09-20 DIAGNOSIS — I10 ESSENTIAL HYPERTENSION: ICD-10-CM

## 2021-09-20 RX ORDER — METOPROLOL SUCCINATE 25 MG/1
TABLET, EXTENDED RELEASE ORAL
Qty: 90 TABLET | Refills: 0 | Status: SHIPPED | OUTPATIENT
Start: 2021-09-20 | End: 2022-04-06 | Stop reason: SDUPTHER

## 2022-04-06 DIAGNOSIS — I10 ESSENTIAL HYPERTENSION: ICD-10-CM

## 2022-04-06 RX ORDER — METOPROLOL SUCCINATE 25 MG/1
25 TABLET, EXTENDED RELEASE ORAL
Qty: 90 TABLET | Refills: 0 | Status: SHIPPED | OUTPATIENT
Start: 2022-04-06 | End: 2022-06-08

## 2022-05-19 ENCOUNTER — TELEPHONE (OUTPATIENT)
Dept: CARDIOLOGY | Facility: MEDICAL CENTER | Age: 87
End: 2022-05-19
Payer: COMMERCIAL

## 2022-05-19 DIAGNOSIS — I48.19 PERSISTENT ATRIAL FIBRILLATION (HCC): ICD-10-CM

## 2022-05-19 NOTE — TELEPHONE ENCOUNTER
CW    Caller: Shaji (son)  Medication Name and Dosage: apixaban (ELIQUIS) 5mg Tab [[id number]]   Did patient contact pharmacy (If no, please call pharmacy first): yes  Medication amount left: 4  [facility]  Other questions (Topic): N/A  Callback Number (Will only call for issues): [phone]

## 2022-06-08 DIAGNOSIS — I10 ESSENTIAL HYPERTENSION: ICD-10-CM

## 2022-06-08 DIAGNOSIS — I10 ESSENTIAL (PRIMARY) HYPERTENSION: ICD-10-CM

## 2022-06-08 DIAGNOSIS — E05.90 HYPERTHYROIDISM: ICD-10-CM

## 2022-06-08 RX ORDER — METOPROLOL SUCCINATE 25 MG/1
25 TABLET, EXTENDED RELEASE ORAL
Qty: 90 TABLET | Refills: 0 | Status: SHIPPED | OUTPATIENT
Start: 2022-06-08 | End: 2022-08-31

## 2022-06-10 RX ORDER — LOSARTAN POTASSIUM 50 MG/1
50 TABLET ORAL DAILY
Qty: 90 TABLET | Refills: 0 | OUTPATIENT
Start: 2022-06-10

## 2022-06-10 RX ORDER — ATORVASTATIN CALCIUM 40 MG/1
40 TABLET, FILM COATED ORAL EVERY EVENING
Qty: 90 TABLET | Refills: 0 | Status: SHIPPED | OUTPATIENT
Start: 2022-06-10 | End: 2022-08-31

## 2022-07-29 DIAGNOSIS — I10 ESSENTIAL (PRIMARY) HYPERTENSION: ICD-10-CM

## 2022-07-29 RX ORDER — LOSARTAN POTASSIUM 50 MG/1
50 TABLET ORAL DAILY
Qty: 30 TABLET | Refills: 0 | Status: SHIPPED | OUTPATIENT
Start: 2022-07-29 | End: 2022-10-20 | Stop reason: SDUPTHER

## 2022-07-29 NOTE — TELEPHONE ENCOUNTER
Is the patient due for a refill? Yes    Was the patient seen the past year? No    Date of last office visit: 3.19.21    Does the patient have an upcoming appointment?  No    Provider to refill:CW    Does the patients insurance require a 100 day supply?  No

## 2022-08-23 DIAGNOSIS — I48.19 PERSISTENT ATRIAL FIBRILLATION (HCC): ICD-10-CM

## 2022-08-24 RX ORDER — APIXABAN 5 MG/1
TABLET, FILM COATED ORAL
Qty: 60 TABLET | Refills: 0 | Status: SHIPPED | OUTPATIENT
Start: 2022-08-24 | End: 2022-10-10

## 2022-08-30 DIAGNOSIS — I10 ESSENTIAL HYPERTENSION: ICD-10-CM

## 2022-08-31 RX ORDER — METOPROLOL SUCCINATE 25 MG/1
25 TABLET, EXTENDED RELEASE ORAL
Qty: 90 TABLET | Refills: 0 | Status: SHIPPED | OUTPATIENT
Start: 2022-08-31 | End: 2022-10-20 | Stop reason: SDUPTHER

## 2022-10-06 DIAGNOSIS — I48.19 PERSISTENT ATRIAL FIBRILLATION (HCC): ICD-10-CM

## 2022-10-06 DIAGNOSIS — E05.90 HYPERTHYROIDISM: ICD-10-CM

## 2022-10-06 NOTE — TELEPHONE ENCOUNTER
Is the patient due for a refill? Yes    Was the patient seen the past year? Yes    Date of last office visit: 3/19/21    Does the patient have an upcoming appointment?  No   If yes, When?     Provider to refill:CW    Does the patients insurance require a 100 day supply?  No

## 2022-10-10 RX ORDER — ATORVASTATIN CALCIUM 40 MG/1
40 TABLET, FILM COATED ORAL
Qty: 30 TABLET | Refills: 0 | Status: SHIPPED | OUTPATIENT
Start: 2022-10-10 | End: 2022-10-20 | Stop reason: SDUPTHER

## 2022-10-20 ENCOUNTER — OFFICE VISIT (OUTPATIENT)
Dept: CARDIOLOGY | Facility: MEDICAL CENTER | Age: 87
End: 2022-10-20
Payer: MEDICARE

## 2022-10-20 VITALS
DIASTOLIC BLOOD PRESSURE: 68 MMHG | OXYGEN SATURATION: 98 % | WEIGHT: 172 LBS | RESPIRATION RATE: 16 BRPM | SYSTOLIC BLOOD PRESSURE: 122 MMHG | BODY MASS INDEX: 23.33 KG/M2 | HEART RATE: 77 BPM

## 2022-10-20 DIAGNOSIS — I48.19 PERSISTENT ATRIAL FIBRILLATION (HCC): ICD-10-CM

## 2022-10-20 DIAGNOSIS — I48.91 HYPERCOAGULABILITY DUE TO ATRIAL FIBRILLATION (HCC): Chronic | ICD-10-CM

## 2022-10-20 DIAGNOSIS — D68.69 HYPERCOAGULABILITY DUE TO ATRIAL FIBRILLATION (HCC): Chronic | ICD-10-CM

## 2022-10-20 DIAGNOSIS — I10 ESSENTIAL HYPERTENSION: ICD-10-CM

## 2022-10-20 DIAGNOSIS — I10 ESSENTIAL (PRIMARY) HYPERTENSION: ICD-10-CM

## 2022-10-20 DIAGNOSIS — E05.90 HYPERTHYROIDISM: ICD-10-CM

## 2022-10-20 DIAGNOSIS — Z86.73 H/O: CVA (CEREBROVASCULAR ACCIDENT): Chronic | ICD-10-CM

## 2022-10-20 DIAGNOSIS — I63.9 CEREBROVASCULAR ACCIDENT (CVA), UNSPECIFIED MECHANISM (HCC): ICD-10-CM

## 2022-10-20 PROCEDURE — 99214 OFFICE O/P EST MOD 30 MIN: CPT | Performed by: INTERNAL MEDICINE

## 2022-10-20 RX ORDER — ATORVASTATIN CALCIUM 40 MG/1
40 TABLET, FILM COATED ORAL
Qty: 90 TABLET | Refills: 3 | Status: SHIPPED | OUTPATIENT
Start: 2022-10-20 | End: 2023-01-17

## 2022-10-20 RX ORDER — METOPROLOL SUCCINATE 25 MG/1
25 TABLET, EXTENDED RELEASE ORAL
Qty: 90 TABLET | Refills: 3 | Status: SHIPPED | OUTPATIENT
Start: 2022-10-20 | End: 2023-01-17 | Stop reason: SDUPTHER

## 2022-10-20 RX ORDER — LOSARTAN POTASSIUM 50 MG/1
50 TABLET ORAL DAILY
Qty: 90 TABLET | Refills: 3 | Status: SHIPPED | OUTPATIENT
Start: 2022-10-20 | End: 2023-01-17 | Stop reason: SDUPTHER

## 2022-10-20 RX ORDER — DILTIAZEM HYDROCHLORIDE 120 MG/1
120 CAPSULE, COATED, EXTENDED RELEASE ORAL DAILY
Qty: 90 CAPSULE | Refills: 3 | Status: SHIPPED | OUTPATIENT
Start: 2022-10-20 | End: 2023-01-17

## 2022-10-20 ASSESSMENT — FIBROSIS 4 INDEX: FIB4 SCORE: 2.23

## 2022-10-20 NOTE — PATIENT INSTRUCTIONS
NEVER STOP ELIQUIS OR ALTERNATIVE WITHOUT TALKING TO ME    TO IMPROVE NIGHT SWEATS  Stop lipitor for a month if nothing different go back to the medication and try the next medication on the list  Stop metoprolol  Stop losartan  Stop diltiazem    If no changes see PMD for cancer evaluation    I am a [facility] Cardiologist providing care with [facility].  Please contact [facility] for scheduling and rescheduling at [phone].    For all clinical questions related to your heart care including medications, always call the main [facility] Cardiology office in [place] at [phone].    If you need to see me or Anais Mendoza (Nurse practitioner), call [facility] or use [employer].    [employer] is the easiest way to communicate.    Call the [employer] line to get set up, either on your computer or by downloading an niki on your smart phone.  Keep them on the phone with you until you are sure you can access the niki or website.  If you use your phone, you can set up a finger print to use if you tend to forget passwords.  Call [phone] or [phone].

## 2022-10-20 NOTE — PROGRESS NOTES
Chief Complaint   Patient presents with    Atrial Fibrillation     F/V Dx: Persistent atrial fibrillation (HCC)       Subjective     Kerwin Wheat is a 88 y.o. male who presents today for follow-up of his history of atrial fibrillation with a history of stroke when he stopped anticoagulation    He has been having night sweats and fatigue since he started his medications sure that it is one of the medications    Past Medical History:   Diagnosis Date    2002     Arrhythmia     Chronic anticoagulation     H/O: CVA (cerebrovascular accident) - had CVA off Eliquis 12/2020      History reviewed. No pertinent surgical history.  History reviewed. No pertinent family history.  Social History     Socioeconomic History    Marital status: Single     Spouse name: Not on file    Number of children: Not on file    Years of education: Not on file    Highest education level: Not on file   Occupational History    Not on file   Tobacco Use    Smoking status: Never    Smokeless tobacco: Never   Vaping Use    Vaping Use: Never used   Substance and Sexual Activity    Alcohol use: Not Currently    Drug use: Never    Sexual activity: Not on file   Other Topics Concern    Not on file   Social History Narrative    Not on file     Social Determinants of Health     Financial Resource Strain: Not on file   Food Insecurity: Not on file   Transportation Needs: Not on file   Physical Activity: Not on file   Stress: Not on file   Social Connections: Not on file   Intimate Partner Violence: Not on file   Housing Stability: Not on file     No Known Allergies  Outpatient Encounter Medications as of 10/20/2022   Medication Sig Dispense Refill    apixaban (ELIQUIS) 5mg Tab Take 1 Tablet by mouth 2 times a day. **LAST SEEN 3/2021 .  TO ENSURE FURTHER REFILLS, CALL [phone] TO SCHEDULE FOLLOW UP VISIT.** 60 Tablet 0    atorvastatin (LIPITOR) 40 MG Tab Take 1 Tablet by mouth every day. **LAST SEEN 3/2021 .  TO ENSURE FURTHER REFILLS, CALL  [phone] TO SCHEDULE FOLLOW UP VISIT.** (Patient taking differently: Take 40 mg by mouth every day. Half in the AM and Half in the PM Per pt) 30 Tablet 0    metoprolol SR (TOPROL XL) 25 MG TABLET SR 24 HR TAKE 1 TABLET BY MOUTH EVERY DAY 90 Tablet 0    losartan (COZAAR) 50 MG Tab TAKE 1 TABLET BY MOUTH EVERY DAY 30 Tablet 0    Multiple Vitamin (MULTIVITAMIN) capsule Take 1 capsule by mouth every day.      vitamin D (VITAMIND D3) 1000 UNIT Tab Take 1 tablet by mouth every day.      DILTIAZem CD (CARDIZEM CD) 120 MG CAPSULE SR 24 HR Take 1 capsule by mouth every day. 90 capsule 3    Magnesium Gluconate (MAGNESIUM 27 PO) Take  by mouth.      Ascorbic Acid (ADOLFO-C PO) Take  by mouth.       No facility-administered encounter medications on file as of 10/20/2022.     ROS           Objective     /68 (BP Location: Left arm, Patient Position: Sitting, BP Cuff Size: Adult)   Pulse 77   Resp 16   Wt 78 kg (172 lb)   SpO2 98%   BMI 23.33 kg/m²     Physical Exam  Constitutional:       General: He is not in acute distress.     Appearance: He is not diaphoretic.   HENT:      Mouth/Throat:      Comments: Wearing a mask for COVID protocol  Eyes:      General: No scleral icterus.  Neck:      Vascular: No JVD.   Cardiovascular:      Rate and Rhythm: Normal rate. Rhythm irregular.      Heart sounds: Normal heart sounds. No murmur heard.    No friction rub. No gallop.   Pulmonary:      Effort: No respiratory distress.      Breath sounds: No wheezing or rales.   Abdominal:      General: Bowel sounds are normal.      Palpations: Abdomen is soft.   Musculoskeletal:      Right lower leg: No edema.      Left lower leg: No edema.   Skin:     Findings: No rash.   Neurological:      Mental Status: He is alert. Mental status is at baseline.   Psychiatric:         Mood and Affect: Mood normal.        We reviewed in person the most recent labs  No results found for this or any previous visit (from the past 5040  hour(s)).        Assessment & Plan     1. 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 Essential (primary) hypertension  losartan (COZAAR) 50 MG Tab      3. Cerebrovascular accident (CVA), unspecified mechanism (HCC)        4. Persistent atrial fibrillation (HCC)  apixaban (ELIQUIS) 5mg Tab      5. H/O: CVA (cerebrovascular accident) - had CVA off Eliquis 12/2020        6. Essential hypertension  metoprolol SR (TOPROL XL) 25 MG TABLET SR 24 HR      7. Hyperthyroidism  atorvastatin (LIPITOR) 40 MG Tab          Medical Decision Making: Today's Assessment/Status/Plan:        It was my pleasure to meet with Mr. Wheat.    We addressed the management of hypertension at today's visit. Blood pressure is well controlled.  We specifically assessed the labs on hypertension treatment    We addressed the management of dyslipidemia at today's visit. He is on appropriate statin.    We addressed the management of atrial fibrillation.  He is on proper anticoagulation cholesterol management and rate or rhythm control as appropriate.  We addressed the potential side effects and laboratory follow-up for these medications.    We addressed the management of chronic anticoagulation at today's visit. He understands the risks and benefits of chronic anticoagulation.  We reviewed and verified the results of annual testing for anemia and kidney function.    I gave him the following written device to see if we can figure out which medication could be contributing to night sweats or fatigue if is not this and he needs to see primary care for work-up of cancer    NEVER STOP ELIQUIS OR ALTERNATIVE WITHOUT TALKING TO ME    TO IMPROVE NIGHT SWEATS  Stop lipitor for a month if nothing different go back to the medication and try the next medication on the list  Stop metoprolol  Stop losartan  Stop diltiazem    If no changes see PMD for cancer evaluation    I will see Mr. Wheat  back in 6 months time and encouraged him to follow up with us over the phone or electronically using my MyChart as issues arise.    It is my pleasure to participate in the care of Mr. Wheat.  Please do not hesitate to contact me with questions or concerns.    Olayinka Chaudhry MD PhD [facility]  Cardiologist [facility]    Please note that this dictation was created using voice recognition software. There may be errors I did not discover before finalizing the note.

## 2022-10-27 ENCOUNTER — TELEPHONE (OUTPATIENT)
Dept: HEALTH INFORMATION MANAGEMENT | Facility: OTHER | Age: 87
End: 2022-10-27

## 2022-10-27 NOTE — TELEPHONE ENCOUNTER
Outcome: Patient will c/b to make an appointment with PCP. Been 1 year since last seen.     Please transfer to [facility] at [phone] when patient returns call.        Attempt # 1

## 2023-01-25 PROBLEM — N18.32 STAGE 3B CHRONIC KIDNEY DISEASE: Chronic | Status: ACTIVE | Noted: 2023-01-25

## 2023-04-13 ENCOUNTER — APPOINTMENT (OUTPATIENT)
Dept: MEDICAL GROUP | Facility: PHYSICIAN GROUP | Age: 88
End: 2023-04-13
Payer: MEDICARE

## 2024-02-01 DIAGNOSIS — I10 ESSENTIAL (PRIMARY) HYPERTENSION: ICD-10-CM

## 2024-02-01 DIAGNOSIS — I10 ESSENTIAL HYPERTENSION: ICD-10-CM

## 2024-02-01 RX ORDER — METOPROLOL SUCCINATE 25 MG/1
25 TABLET, EXTENDED RELEASE ORAL
Qty: 90 TABLET | Refills: 3 | Status: SHIPPED | OUTPATIENT
Start: 2024-02-01

## 2024-02-01 RX ORDER — LOSARTAN POTASSIUM 50 MG/1
50 TABLET ORAL DAILY
Qty: 90 TABLET | Refills: 3 | Status: SHIPPED | OUTPATIENT
Start: 2024-02-01 | End: 2024-03-12

## 2024-02-01 NOTE — TELEPHONE ENCOUNTER
Received request via: Patient    Was the patient seen in the last year in this department? Yes    Does the patient have an active prescription (recently filled or refills available) for medication(s) requested?  Yes    Pharmacy Name: "[facility] in [place], [place]    Does the patient have shelter Plus and need 100 day supply (blood pressure, diabetes and cholesterol meds only)? NO

## 2024-02-13 ENCOUNTER — APPOINTMENT (OUTPATIENT)
Dept: MEDICAL GROUP | Facility: PHYSICIAN GROUP | Age: 89
End: 2024-02-13
Payer: MEDICARE

## 2024-10-18 ENCOUNTER — TELEPHONE (OUTPATIENT)
Dept: CARDIOLOGY | Facility: MEDICAL CENTER | Age: 89
End: 2024-10-18
Payer: MEDICARE

## 2025-02-22 DIAGNOSIS — I10 ESSENTIAL HYPERTENSION: ICD-10-CM

## 2025-02-22 DIAGNOSIS — I10 ESSENTIAL (PRIMARY) HYPERTENSION: ICD-10-CM

## 2025-02-24 RX ORDER — LOSARTAN POTASSIUM 50 MG/1
50 TABLET ORAL DAILY
Qty: 90 TABLET | Refills: 0 | OUTPATIENT
Start: 2025-02-24

## 2025-02-24 RX ORDER — METOPROLOL SUCCINATE 25 MG/1
25 TABLET, EXTENDED RELEASE ORAL
Qty: 90 TABLET | Refills: 0 | Status: SHIPPED | OUTPATIENT
Start: 2025-02-24

## 2025-02-24 RX ORDER — LOSARTAN POTASSIUM 50 MG/1
50 TABLET ORAL DAILY
Qty: 90 TABLET | Refills: 0 | Status: SHIPPED | OUTPATIENT
Start: 2025-02-24

## 2025-02-24 RX ORDER — METOPROLOL SUCCINATE 25 MG/1
25 TABLET, EXTENDED RELEASE ORAL DAILY
Qty: 90 TABLET | Refills: 0 | OUTPATIENT
Start: 2025-02-24

## 2025-02-24 NOTE — TELEPHONE ENCOUNTER
90 day courtesy refill for Losartan and Metoprolol sent. Pt needs labs/follow up appt. Last seen 1/17/23.  Needs BMP - last done 1/17/23.    To Scheduling - please contact pt to schedule annual visit. Thank you!

## 2025-02-24 NOTE — TELEPHONE ENCOUNTER
Amanda King to Me Regarding result: losartan (COZAAR) 50 MG Tab [Pharmacy Med Name: Losartan Potassium Oral Tablet 50 MG]   CG    2/24/25  1:58 PM  Per last cancelled OV notes, pt is OON and cannot be seen here. Thanks!

## 2025-05-17 DIAGNOSIS — I10 ESSENTIAL (PRIMARY) HYPERTENSION: ICD-10-CM

## 2025-05-17 DIAGNOSIS — I10 ESSENTIAL HYPERTENSION: ICD-10-CM

## 2025-05-17 DIAGNOSIS — I48.19 PERSISTENT ATRIAL FIBRILLATION (HCC): ICD-10-CM

## 2025-05-19 RX ORDER — APIXABAN 5 MG/1
5 TABLET, FILM COATED ORAL 2 TIMES DAILY
Qty: 180 TABLET | Refills: 0 | OUTPATIENT
Start: 2025-05-19

## 2025-05-19 RX ORDER — METOPROLOL SUCCINATE 25 MG/1
25 TABLET, EXTENDED RELEASE ORAL DAILY
Qty: 90 TABLET | Refills: 0 | OUTPATIENT
Start: 2025-05-19

## 2025-05-19 RX ORDER — LOSARTAN POTASSIUM 50 MG/1
50 TABLET ORAL DAILY
Qty: 90 TABLET | Refills: 0 | OUTPATIENT
Start: 2025-05-19

## 2025-05-19 NOTE — TELEPHONE ENCOUNTER
Last visit 1/17/2023. Courtesy refills provided 2/24/25.  Pt refill encounter note 2/24/25, pt is OON. Medication refill denied.